# Patient Record
Sex: FEMALE | Race: WHITE | NOT HISPANIC OR LATINO | Employment: OTHER | URBAN - METROPOLITAN AREA
[De-identification: names, ages, dates, MRNs, and addresses within clinical notes are randomized per-mention and may not be internally consistent; named-entity substitution may affect disease eponyms.]

---

## 2017-01-17 ENCOUNTER — GENERIC CONVERSION - ENCOUNTER (OUTPATIENT)
Dept: OTHER | Facility: OTHER | Age: 72
End: 2017-01-17

## 2017-01-24 ENCOUNTER — GENERIC CONVERSION - ENCOUNTER (OUTPATIENT)
Dept: OTHER | Facility: OTHER | Age: 72
End: 2017-01-24

## 2017-02-01 ENCOUNTER — GENERIC CONVERSION - ENCOUNTER (OUTPATIENT)
Dept: OTHER | Facility: OTHER | Age: 72
End: 2017-02-01

## 2017-02-10 ENCOUNTER — GENERIC CONVERSION - ENCOUNTER (OUTPATIENT)
Dept: OTHER | Facility: OTHER | Age: 72
End: 2017-02-10

## 2017-02-14 ENCOUNTER — GENERIC CONVERSION - ENCOUNTER (OUTPATIENT)
Dept: OTHER | Facility: OTHER | Age: 72
End: 2017-02-14

## 2017-03-07 ENCOUNTER — GENERIC CONVERSION - ENCOUNTER (OUTPATIENT)
Dept: OTHER | Facility: OTHER | Age: 72
End: 2017-03-07

## 2017-04-03 ENCOUNTER — ALLSCRIPTS OFFICE VISIT (OUTPATIENT)
Dept: OTHER | Facility: OTHER | Age: 72
End: 2017-04-03

## 2017-04-25 ENCOUNTER — GENERIC CONVERSION - ENCOUNTER (OUTPATIENT)
Dept: OTHER | Facility: OTHER | Age: 72
End: 2017-04-25

## 2017-04-26 ENCOUNTER — GENERIC CONVERSION - ENCOUNTER (OUTPATIENT)
Dept: OTHER | Facility: OTHER | Age: 72
End: 2017-04-26

## 2017-05-04 ENCOUNTER — ALLSCRIPTS OFFICE VISIT (OUTPATIENT)
Dept: OTHER | Facility: OTHER | Age: 72
End: 2017-05-04

## 2017-05-04 ENCOUNTER — GENERIC CONVERSION - ENCOUNTER (OUTPATIENT)
Dept: OTHER | Facility: OTHER | Age: 72
End: 2017-05-04

## 2017-06-15 ENCOUNTER — GENERIC CONVERSION - ENCOUNTER (OUTPATIENT)
Dept: OTHER | Facility: OTHER | Age: 72
End: 2017-06-15

## 2017-08-15 ENCOUNTER — GENERIC CONVERSION - ENCOUNTER (OUTPATIENT)
Dept: OTHER | Facility: OTHER | Age: 72
End: 2017-08-15

## 2017-09-25 DIAGNOSIS — E11.9 TYPE 2 DIABETES MELLITUS WITHOUT COMPLICATIONS (HCC): ICD-10-CM

## 2017-09-25 DIAGNOSIS — I10 ESSENTIAL (PRIMARY) HYPERTENSION: ICD-10-CM

## 2017-09-25 DIAGNOSIS — Z00.00 ENCOUNTER FOR GENERAL ADULT MEDICAL EXAMINATION WITHOUT ABNORMAL FINDINGS: ICD-10-CM

## 2017-09-25 DIAGNOSIS — I48.0 PAROXYSMAL ATRIAL FIBRILLATION (HCC): ICD-10-CM

## 2017-09-25 DIAGNOSIS — C50.919 MALIGNANT NEOPLASM OF FEMALE BREAST (HCC): ICD-10-CM

## 2017-09-28 ENCOUNTER — APPOINTMENT (OUTPATIENT)
Dept: LAB | Facility: CLINIC | Age: 72
End: 2017-09-28
Payer: MEDICARE

## 2017-09-28 ENCOUNTER — TRANSCRIBE ORDERS (OUTPATIENT)
Dept: LAB | Facility: CLINIC | Age: 72
End: 2017-09-28

## 2017-09-28 DIAGNOSIS — I48.0 PAROXYSMAL ATRIAL FIBRILLATION (HCC): ICD-10-CM

## 2017-09-28 DIAGNOSIS — I10 ESSENTIAL (PRIMARY) HYPERTENSION: ICD-10-CM

## 2017-09-28 DIAGNOSIS — C50.919 MALIGNANT NEOPLASM OF FEMALE BREAST (HCC): ICD-10-CM

## 2017-09-28 DIAGNOSIS — E11.9 TYPE 2 DIABETES MELLITUS WITHOUT COMPLICATIONS (HCC): ICD-10-CM

## 2017-09-28 DIAGNOSIS — Z00.00 ENCOUNTER FOR GENERAL ADULT MEDICAL EXAMINATION WITHOUT ABNORMAL FINDINGS: ICD-10-CM

## 2017-09-28 LAB
ANION GAP SERPL CALCULATED.3IONS-SCNC: 12 MMOL/L (ref 4–13)
BASOPHILS # BLD AUTO: 0.05 THOUSANDS/ΜL (ref 0–0.1)
BASOPHILS NFR BLD AUTO: 1 % (ref 0–1)
BUN SERPL-MCNC: 17 MG/DL (ref 5–25)
CALCIUM SERPL-MCNC: 9.7 MG/DL (ref 8.3–10.1)
CHLORIDE SERPL-SCNC: 107 MMOL/L (ref 100–108)
CHOLEST SERPL-MCNC: 199 MG/DL (ref 50–200)
CO2 SERPL-SCNC: 22 MMOL/L (ref 21–32)
CREAT SERPL-MCNC: 0.97 MG/DL (ref 0.6–1.3)
CREAT UR-MCNC: 298 MG/DL
EOSINOPHIL # BLD AUTO: 0.36 THOUSAND/ΜL (ref 0–0.61)
EOSINOPHIL NFR BLD AUTO: 4 % (ref 0–6)
ERYTHROCYTE [DISTWIDTH] IN BLOOD BY AUTOMATED COUNT: 13.3 % (ref 11.6–15.1)
EST. AVERAGE GLUCOSE BLD GHB EST-MCNC: 154 MG/DL
GFR SERPL CREATININE-BSD FRML MDRD: 59 ML/MIN/1.73SQ M
GLUCOSE P FAST SERPL-MCNC: 139 MG/DL (ref 65–99)
HBA1C MFR BLD: 7 % (ref 4.2–6.3)
HCT VFR BLD AUTO: 42.1 % (ref 34.8–46.1)
HDLC SERPL-MCNC: 68 MG/DL (ref 40–60)
HGB BLD-MCNC: 14 G/DL (ref 11.5–15.4)
LDLC SERPL CALC-MCNC: 99 MG/DL (ref 0–100)
LYMPHOCYTES # BLD AUTO: 2.13 THOUSANDS/ΜL (ref 0.6–4.47)
LYMPHOCYTES NFR BLD AUTO: 22 % (ref 14–44)
MCH RBC QN AUTO: 31.1 PG (ref 26.8–34.3)
MCHC RBC AUTO-ENTMCNC: 33.3 G/DL (ref 31.4–37.4)
MCV RBC AUTO: 94 FL (ref 82–98)
MICROALBUMIN UR-MCNC: 289 MG/L (ref 0–20)
MICROALBUMIN/CREAT 24H UR: 97 MG/G CREATININE (ref 0–30)
MONOCYTES # BLD AUTO: 0.94 THOUSAND/ΜL (ref 0.17–1.22)
MONOCYTES NFR BLD AUTO: 10 % (ref 4–12)
NEUTROPHILS # BLD AUTO: 6.14 THOUSANDS/ΜL (ref 1.85–7.62)
NEUTS SEG NFR BLD AUTO: 63 % (ref 43–75)
NRBC BLD AUTO-RTO: 0 /100 WBCS
PLATELET # BLD AUTO: 353 THOUSANDS/UL (ref 149–390)
PMV BLD AUTO: 11.7 FL (ref 8.9–12.7)
POTASSIUM SERPL-SCNC: 4.2 MMOL/L (ref 3.5–5.3)
RBC # BLD AUTO: 4.5 MILLION/UL (ref 3.81–5.12)
SODIUM SERPL-SCNC: 141 MMOL/L (ref 136–145)
TRIGL SERPL-MCNC: 159 MG/DL
WBC # BLD AUTO: 9.65 THOUSAND/UL (ref 4.31–10.16)

## 2017-09-28 PROCEDURE — 83036 HEMOGLOBIN GLYCOSYLATED A1C: CPT

## 2017-09-28 PROCEDURE — 80048 BASIC METABOLIC PNL TOTAL CA: CPT

## 2017-09-28 PROCEDURE — 80061 LIPID PANEL: CPT

## 2017-09-28 PROCEDURE — 36415 COLL VENOUS BLD VENIPUNCTURE: CPT

## 2017-09-28 PROCEDURE — 82570 ASSAY OF URINE CREATININE: CPT

## 2017-09-28 PROCEDURE — 85025 COMPLETE CBC W/AUTO DIFF WBC: CPT

## 2017-09-28 PROCEDURE — 82043 UR ALBUMIN QUANTITATIVE: CPT

## 2017-10-04 ENCOUNTER — ALLSCRIPTS OFFICE VISIT (OUTPATIENT)
Dept: OTHER | Facility: OTHER | Age: 72
End: 2017-10-04

## 2017-10-05 ENCOUNTER — ALLSCRIPTS OFFICE VISIT (OUTPATIENT)
Dept: OTHER | Facility: OTHER | Age: 72
End: 2017-10-05

## 2017-10-06 NOTE — PROGRESS NOTES
Assessment  Assessed    1  Paroxysmal a-fib (427 31) (I48 0)   2  Anticoagulated by anticoagulation treatment (V58 61) (Z79 01)   3  Hyperlipidemia (272 4) (E78 5)   4  Obesity (278 00) (E66 9)    Discussion/Summary  Cardiology Discussion Summary Free Text Note Form [facility]:   Patient with multiple medical problems who seems to be doing reasonably well from cardiac standpoint  Previous studies reviewed with patient  Medications reviewed and possible side effects discussed  concepts of cardiovascular disease , signs and symptoms of heart disease  Dietary and risk factor modification reinforced  All questions answered  Safety measures reviewed  Patient advised to report any problems prompting medical attention  Patient report any bleeding issues  Patient does understand the risks and benefits of anticoagulation to prevent thromboembolic risk from atrial fibrillation  Rest of the medications were reviewed  Follow-up with primary care physician  Patient encouraged to continue to lose weight to reduce cardiovascular morbidity and mortality  She is thinking of bariatric surgery which is not very sure about this  Patient had a few questions which were answered  Goals and Barriers: The patient has the current Goals: Chronic anticoagulation  The patent has the current Barriers: None  Patient's Capacity to Self-Care: Patient is able to Self-Care  Patient Education: Educational resources provided: Please see discussion summary  Medication SE Review and Pt Understands Tx: Possible side effects of new medications were reviewed with the patient/guardian today  Counseling Documentation With Imm: The patient was counseled regarding risk factor reductions,-impressions,-risks and benefits of treatment options,-importance of compliance with treatment  Chief Complaint  Chief Complaint Chronic Condition [facility]: Patient is here today for follow up of chronic conditions described in HPI        History of Present Illness  Cardiology [facility] Free Text Note Form [facility]: Patient presents for follow-up visit  Patient denies any chest pain  No shortness of breath out of the ordinary  No history of leg edema orthopnea PND  No history of palpitations or dizziness  Patient denies any history of bleeding issues  Patient is on anticoagulation for atrial fibrillation  She states that she has been compliant with all her present medications  She is trying to lose weight  Review of Systems  Cardiology Female ROS:     Cardiac: as noted in HPI  Skin: No complaints of nonhealing sores or skin rash  Genitourinary: No complaints of recurrent urinary tract infections, frequent urination at night, difficult urination, blood in urine, kidney stones, loss of bladder control, kidney problems, denies any birth control or hormone replacement, is not post menopausal, not currently pregnant  Psychological: No complaints of feeling depressed, anxiety, panic attacks, or difficulty concentrating  General: No complaints of trouble sleeping, lack of energy, fatigue, appetite changes, weight changes, fever, frequent infections, or night sweats  Respiratory: No complaints of shortness of breath, cough with sputum, or wheezing  HEENT: No complaints of serious problems, hearing problems, nose problems, throat problems, or snoring  Gastrointestinal: No complaints of liver problems, nausea, vomiting, heartburn, constipation, bloody stools, diarrhea, problems swallowing, adbominal pain, or rectal bleeding  Hematologic: No complaints of bleeding disorders, anemia, blood clots, or excessive brusing  Neurological: as noted in HPI   Musculoskeletal: arthritis   ROS Reviewed:   ROS reviewed  Active Problems  Problems    1  Anticoagulated by anticoagulation treatment (V58 61) (Z79 01)   2  Arthralgia (719 40)   3  Benign essential hypertension (401 1) (I10)   4  Breast CA (174 9) (C50 919)   5   Cerebrovascular accident, 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)   6  Chronic obstructive pulmonary disease (496) (J44 9)   7  Depression (311) (F32 9)   8  Flu vaccine need (V04 81) (Z23)   9  Generalized osteoarthritis (715 00) (M15 9)   10  Hyperlipidemia (272 4) (E78 5)   11  Mitral valve disorder (424 0) (I05 9)   12  Need for revaccination (V05 9) (Z23)   13  Obesity (278 00) (E66 9)   14  Paroxysmal a-fib (427 31) (I48 0)   15  Proteinuria (791 0) (R80 9)   16  Screening for genitourinary condition (V81 6) (Z13 89)   17  Stroke syndrome (434 91) (I63 9)   18  Type 2 diabetes mellitus (250 00) (E11 9)   19  Visit for screening mammogram (V76 12) (Z12 31)    Past Medical History  Problems    1  History of Echo Transesophageal 2-D   2  History of Elevation of level of transaminase or lactic acid dehydrogenase (LDH) (790 4)   (R74 0)   3  History of acute respiratory failure (V12 69) (Z87 09)   4  History of athlete's foot (V12 09) (Z86 19)   5  History of chest pain (V13 89) (Z87 898)   6  History of hypercholesterolemia (V12 29) (Z86 39)   7  History of shortness of breath (V13 89) (Z87 898)   8  History of tinea corporis (V12 09) (Z86 19)   9  History of Inflammatory Disorders Of Breast (611 0)   10  History of Malignant Female Breast Neoplasm (V10 3)   11  History of Pulmonary Edema (514)   12  History of Septicemia (038 9) (A41 9)   13  History of Skin rash (782 1) (R21)   14  History of Splenic disorder (289 50) (D73 9)   15  History of Systemic Inflammatory Response Syndrome (SIRS) Due To Infectious    Process (995 91)  Active Problems And Past Medical History Reviewed: The active problems and past medical history were reviewed and updated today  Surgical History  Problems    1  History of Breast Reconstruction With Implant Prosthesis Bilateral   2  History of Breast Surgery Mastectomy   3  History of Splenectomy   4  History of Tonsillectomy  Surgical History Reviewed: The surgical history was reviewed and updated today         Family History  Mother 1  Family history of Diabetes Mellitus (V18 0)   2  Family history of Reported Family History Of Kidney Disease  Family History    3  Family history of Breast Cancer (V16 3)  Family History Reviewed: The family history was reviewed and updated today  Social History  Problems    · Living Independently With Spouse   · Never a smoker   · Never Drank Alcohol   · Never Used Drugs   · Retired From Work   · Denied: History of Smoking Cigarettes   ·   Social History Reviewed: The social history was reviewed and updated today  Current Meds   1  Accu-Chek Regi Plus In Vitro Strip; TEST AS DIRECTED THREE TIMES DAILY; Therapy: 24Apr2014 to (Evaluate:72Jli0328)  Requested for: 79ZPH4453; Last   Rx:10Mar2016 Ordered   2  Citalopram Hydrobromide 40 MG Oral Tablet; TAKE 1 TABLET DAILY; Therapy: 24Apr2014 to (Evaluate:62Uyo8663)  Requested for: 75NRM5983; Last   Rx:15Jfp6710 Ordered   3  Combigan 0 2-0 5 % Ophthalmic Solution; INSTILL 1 DROP Daily EACH EYE; Therapy: 08WSW4741 to (Evaluate:49Kvg9928) Recorded   4  Dilt- MG Oral Capsule Extended Release 24 Hour; take 1 capsule daily; Therapy: 42Eel3134 to (Evaluate:11Jun2018)  Requested for: 86OLJ9464; Last   Rx:16Jun2017 Ordered   5  Eliquis 5 MG Oral Tablet; Take 1 tablet twice daily; Therapy: 60QQN8397 to (Evaluate:08Jun2018)  Requested for: 13Jun2017; Last   Rx:13Jun2017 Ordered   6  Irbesartan 150 MG Oral Tablet; TAKE 2 tablets      ONE TIME DAILY; Therapy: 24Apr2014 to (Evaluate:80Jyq0635)  Requested for: 09OCZ1206; Last   Rx:04Oct2017 Ordered   7  Mag- (64 Mg) MG TBCR; TAKE 1 TABLET 3 times daily; Therapy: 24Apr2014 to Recorded   8  MetFORMIN HCl - 500 MG Oral Tablet; TAKE 1 TABLET EVERY MORNING AND TAKE 2   TABLETS IN THE EVENING; Therapy: 22JRY5998 to (Last Rx:13Apr2017)  Requested for: 13Apr2017 Ordered   9  Metoprolol Tartrate 25 MG Oral Tablet; take 1 tablet every day;    Therapy: 11DQO5511 to (Evaluate:78Nbi7837)  Requested for: 25UQX3421; Last   Rx:70Mmf8065 Ordered   10  Simvastatin 20 MG Oral Tablet; TAKE 1 TABLET DAILY; Therapy: 40PRU1816 to )  Requested for: 13Ggr8153; Last    Rx:11Dan5045 Ordered  Medication List Reviewed: The medication list was reviewed and updated today  Allergies  Medication    1  No Known Drug Allergies    Vitals  Vital Signs    Recorded: 56UUY4808 09:04AM   Heart Rate 567   Systolic 959   Diastolic 80   Height 5 ft 6 in   Weight 255 lb    BMI Calculated 41 16   BSA Calculated 2 22   O2 Saturation 96     Physical Exam    Constitutional   General appearance: No acute distress, well appearing and well nourished  Eyes   Conjunctiva and Sclera examination: Conjunctiva pink, sclera anicteric  Ears, Nose, Mouth, and Throat - Oropharynx: Clear, nares are clear, mucous membranes are moist    Neck   Neck and thyroid: Normal, supple, trachea midline, no thyromegaly  Pulmonary   Respiratory effort: No increased work of breathing or signs of respiratory distress  Auscultation of lungs: Clear to auscultation, no rales, no rhonchi, no wheezing, good air movement  Cardiovascular   Auscultation of heart: Normal rate and rhythm, normal S1 and S2, no murmurs  Carotid pulses: Normal, 2+ bilaterally  Peripheral vascular exam: Normal pulses throughout, no tenderness, erythema or swelling  Pedal pulses: Normal, 2+ bilaterally  Examination of extremities for edema and/or varicosities: Normal     Abdomen   Abdomen: Non-tender and no distention  Liver and spleen: No hepatomegaly or splenomegaly  Musculoskeletal Gait and station: Normal gait  -Digits and nails: Normal without clubbing or cyanosis -Inspection/palpation of joints, bones, and muscles: Normal, ROM normal     Skin - Skin and subcutaneous tissue: Normal without rashes or lesions  Skin is warm and well perfused, normal turgor      Neurologic - Cranial nerves: II - XII intact -Speech: Normal     Psychiatric - Orientation to person, place, and time: Normal -Mood and affect: Normal       Future Appointments    Date/Time Provider Specialty Site   12/06/2017 10:15 AM Aliza Beltran,  Internal Medicine [address] [facility]     Signatures   Electronically signed by : QUYEN Torre ; Oct  5 2017  2:42PM EST                       (Author)

## 2017-10-27 NOTE — PROGRESS NOTES
Assessment  1  Proteinuria (791 0) (R80 9)   2  Breast CA (174 9) (C50 919)   3  Benign essential hypertension (401 1) (I10)   4  Paroxysmal a-fib (427 31) (I48 0)   5  Type 2 diabetes mellitus (250 00) (E11 9)   6  Obesity (278 00) (E66 9)    Plan  Benign essential hypertension    · From  Irbesartan 150 MG Oral Tablet TAKE 1 TABLET ONE TIME DAILY To Irbesartan 150  MG Oral Tablet (Avapro) TAKE 2 tablets    ONE TIME DAILY  Proteinuria    · Jaciel Duckworth MD, [facility]  Nephrology Co-Management  *  Status: Active  Requested for: 20HIQ4934  Care Summary provided  : Yes  Unlinked    · Ciclopirox Olamine 0 77 % External Cream    Discussion/Summary  Discussion Summary:   She has diabetes which is reasonably well controlled  has increasing proteinuria and is already on irbesartan  She will increase that to 2 tablets per day and watch her blood pressure closely  If it drops down to 100-110 and she will cut back to one per day  advised her to see the nephrologist for evaluation  she's stable  She needs to lose weight and is contemplating gastric surgery though I did not encourage her to do so  any case she will increase her irbesartan  She will see the nephrologist and I will see her back here in approximately 2 months  Chief Complaint  Chief Complaint Free Text Note Form: Problems are #1 diabetes #2 hypertension #3 atrial fibrillation number for proteinuria #5 hyperlipidemia #6 depression      History of Present Illness  HPI: She comes in for follow-up  She has developed increased proteinuria  She had 289 mg/L on her recent urine  She is already on irbesartan 150 mg per day  Her creatinine is normal at 0 97   otherwise feels well  She is markedly overweight  She has a history of breast cancer and she is anticoagulated for her atrial fibrillation  Review of Systems  Complete-Female:   Constitutional: No fever, no chills, feels well, no tiredness, no recent weight gain or weight loss     Eyes: No complaints of eye pain, no red eyes, no eyesight problems, no discharge, no dry eyes, no itching of eyes  ENT: no complaints of earache, no loss of hearing, no nose bleeds, no nasal discharge, no sore throat, no hoarseness  Cardiovascular: Denies chest pain  Up-to-date on eye checkups  , but-- No complaints of slow heart rate, no fast heart rate, no chest pain, no palpitations, no leg claudication, no lower extremity edema  Respiratory: No complaints of shortness of breath, no wheezing, no cough, no SOB on exertion, no orthopnea, no PND  Gastrointestinal: No GI complaints  , but-- No complaints of abdominal pain, no constipation, no nausea or vomiting, no diarrhea, no bloody stools  Genitourinary: No complaints of dysuria, no incontinence, no pelvic pain, no dysmenorrhea, no vaginal discharge or bleeding  Musculoskeletal: arthralgias,-- joint stiffness-- and-- Had recent right knee replacement, but-- No complaints of arthralgias, no myalgias, no joint swelling or stiffness, no limb pain or swelling  Integumentary: No complaints of skin rash or lesions, no itching, no skin wounds, no breast pain or lump  Neurological: No complaints of headache, no confusion, no convulsions, no numbness, no dizziness or fainting, no tingling, no limb weakness, no difficulty walking  Psychiatric: depression-- and-- History of depression  Fairly stable on Celexa, but-- Not suicidal, no sleep disturbance, no anxiety or depression, no change in personality, no emotional problems  Endocrine: Has type 2 diabetes  A1c is 7, but-- as noted in HPI  Hematologic/Lymphatic: No complaints of swollen glands, no swollen glands in the neck, does not bleed easily, does not bruise easily  Active Problems  1  Anticoagulated by anticoagulation treatment (V58 61) (Z79 01)   2  Arthralgia (719 40)   3  Benign essential hypertension (401 1) (I10)   4  Breast CA (174 9) (C50 919)   5  Cerebrovascular accident, embolic (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 29) (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 8)   6   Chronic obstructive pulmonary disease (496) (J44 9)   7  Depression (311) (F32 9)   8  Flu vaccine need (V04 81) (Z23)   9  Generalized osteoarthritis (715 00) (M15 9)   10  Hyperlipidemia (272 4) (E78 5)   11  Mitral valve disorder (424 0) (I05 9)   12  Need for revaccination (V05 9) (Z23)   13  Obesity (278 00) (E66 9)   14  Paroxysmal a-fib (427 31) (I48 0)   15  Screening for genitourinary condition (V81 6) (Z13 89)   16  Stroke syndrome (434 91) (I63 9)   17  Type 2 diabetes mellitus (250 00) (E11 9)   18  Visit for screening mammogram (V76 12) (Z12 31)    Past Medical History  1  History of Echo Transesophageal 2-D   2  History of Elevation of level of transaminase or lactic acid dehydrogenase (LDH) (790 4) (R74 0)   3  History of acute respiratory failure (V12 69) (Z87 09)   4  History of athlete's foot (V12 09) (Z86 19)   5  History of chest pain (V13 89) (Z87 898)   6  History of hypercholesterolemia (V12 29) (Z86 39)   7  History of shortness of breath (V13 89) (Z87 898)   8  History of tinea corporis (V12 09) (Z86 19)   9  History of Inflammatory Disorders Of Breast (611 0)   10  History of Malignant Female Breast Neoplasm (V10 3)   11  History of Pulmonary Edema (514)   12  History of Septicemia (038 9) (A41 9)   13  History of Skin rash (782 1) (R21)   14  History of Splenic disorder (289 50) (D73 9)   15  History of Systemic Inflammatory Response Syndrome (SIRS) Due To Infectious Process (995 91)  Active Problems And Past Medical History Reviewed: The active problems and past medical history were reviewed and updated today  Surgical History  1  History of Breast Reconstruction With Implant Prosthesis Bilateral   2  History of Breast Surgery Mastectomy   3  History of Splenectomy   4  History of Tonsillectomy  Surgical History Reviewed: The surgical history was reviewed and updated today  Family History  Mother    1  Family history of Diabetes Mellitus (V18 0)   2   Family history of Reported Family History Of Kidney Disease  Family History    3  Family history of Breast Cancer (V16 3)  Family History Reviewed: The family history was reviewed and updated today  Social History   · Living Independently With Spouse   · Never a smoker   · Never Drank Alcohol   · Never Used Drugs   · Retired From Work   · Denied: History of Smoking Cigarettes   ·   Social History Reviewed: The social history was reviewed and updated today  The social history was reviewed and is unchanged  Current Meds   1  Accu-Chek Regi Plus In Vitro Strip; TEST AS DIRECTED THREE TIMES DAILY; Therapy: 24Apr2014 to (Evaluate:84Sik2215)  Requested for: 54PEP9291; Last Rx:10Mar2016   Ordered   2  Ciclopirox Olamine 0 77 % External Cream; APPLY INCH  PRN; Therapy: 25CTM3835 to (Evaluate:12Nov2015) Recorded   3  Citalopram Hydrobromide 40 MG Oral Tablet; TAKE 1 TABLET DAILY; Therapy: 24Apr2014 to (Evaluate:72Snd4448)  Requested for: 68PNY9575; Last Rx:11Pyw6843   Ordered   4  Combigan 0 2-0 5 % Ophthalmic Solution; INSTILL 1 DROP Daily EACH EYE; Therapy: 62XGS3081 to (Evaluate:09Feb2015) Recorded   5  Dilt- MG Oral Capsule Extended Release 24 Hour; take 1 capsule daily; Therapy: 51Nlk7725 to (Evaluate:11Jun2018)  Requested for: 41URA7099; Last Rx:16Jun2017   Ordered   6  Eliquis 5 MG Oral Tablet; Take 1 tablet twice daily; Therapy: 84HWP3469 to (Evaluate:08Jun2018)  Requested for: 86NTP9677; Last Rx:13Jun2017   Ordered   7  Irbesartan 150 MG Oral Tablet; TAKE 1 TABLET ONE TIME DAILY; Therapy: 24Apr2014 to (Evaluate:51Gdr2069)  Requested for: 27GMQ3271; Last Rx:22Vmh4343   Ordered   8  Mag- (64 Mg) MG TBCR; TAKE 1 TABLET 3 times daily; Therapy: 24Apr2014 to Recorded   9  MetFORMIN HCl - 500 MG Oral Tablet; TAKE 1 TABLET EVERY MORNING AND TAKE 2 TABLETS IN   THE EVENING; Therapy: 50DFM4617 to (Last Rx:13Apr2017)  Requested for: 13Apr2017 Ordered   10   Metoprolol Tartrate 25 MG Oral Tablet; take 1 tablet every day;    Therapy: 59GTN9903 to (Evaluate:04Tzz7930)  Requested for: 82IGY4028; Last Rx:49Qrr3200    Ordered   11  Simvastatin 20 MG Oral Tablet; TAKE 1 TABLET DAILY; Therapy: 04AHM3777 to [id number])  Requested for: 20Apr2017; Last Rx:20Apr2017    Ordered  Medication List Reviewed: The medication list was reviewed and updated today  Allergies  1  No Known Drug Allergies    Vitals  Vital Signs    Recorded: 60BVK5741 01:59PM   Heart Rate 70    Systolic 251    Diastolic 80    Height 5 ft 6 in    Patient Refused Weight Yes Yes   O2 Saturation 98      Physical Exam    Constitutional   General appearance: Abnormal  -- She is overweight  She refuses to get weighed  Her blood pressure is 122/74  Eyes   Conjunctiva and lids: No swelling, erythema or discharge  Pupils and irises: Equal, round and reactive to light  Ears, Nose, Mouth, and Throat   External inspection of ears and nose: Normal     Nasal mucosa, septum, and turbinates: Normal without edema or erythema  Oropharynx: Normal with no erythema, edema, exudate or lesions  Pulmonary   Respiratory effort: No increased work of breathing or signs of respiratory distress  Auscultation of lungs: Clear to auscultation  -- Lungs are clear  Cardiovascular   Auscultation of heart: Abnormal  -- Rhythm is irregularly irregular  Rates in the 76s  Examination of extremities for edema and/or varicosities: Abnormal  -- Intact peripheral pulses  No peripheral edema  Carotid pulses: Normal     Abdomen   Abdomen: Abnormal  -- Abdomen soft obese  Lymphatic   Palpation of lymph nodes in neck: No lymphadenopathy  Musculoskeletal   Inspection/palpation of joints, bones, and muscles: Abnormal  -- Well healed scar of the right knee  Skin   Skin and subcutaneous tissue: Normal without rashes or lesions  Neurologic   Cranial nerves: Cranial nerves 2-12 intact  Sensation: No sensory loss      Psychiatric   Orientation to person, place, and time: Normal     Mood and affect: Normal          Results/Data  PHQ-9 Adult Depression Screening 54YCW9613 01:55PM User, [employer]     Test Name Result Flag Reference   PHQ-9 Adult Depression Score 0     Over the last two weeks, how often have you been bothered by any of the following problems? Little interest or pleasure in doing things: Not at all - 0  Feeling down, depressed, or hopeless: Not at all - 0  Trouble falling or staying asleep, or sleeping too much: Not at all - 0  Feeling tired or having little energy: Not at all - 0  Poor appetite or over eating: Not at all - 0  Feeling bad about yourself - or that you are a failure or have let yourself or your family down: Not at all - 0  Trouble concentrating on things, such as reading the newspaper or watching television: Not at all - 0  Moving or speaking so slowly that other people could have noticed  Or the opposite -  being so fidgety or restless that you have been moving around a lot more than usual: Not at all - 0  Thoughts that you would be better off dead, or of hurting yourself in some way: Not at all - 0   PHQ-9 Adult Depression Screening Negative     PHQ-9 Difficulty Level Not difficult at all     PHQ-9 Severity No Depression       Falls Risk Assessment (Dx Z13 89 Screen for Neurologic Disorder) 44RLK7746 01:55PM User, [employer]     Test Name Result Flag Reference   Falls Risk      No falls in the past year       Future Appointments    Date/Time Provider Specialty Site   10/05/2017 09:00 AM QUYEN Vasques   Cardiology [facility]     Signatures   Electronically signed by : Khalif Fischer DO; Oct  4 2017  2:31PM EST                       (Author)

## 2017-11-21 ENCOUNTER — GENERIC CONVERSION - ENCOUNTER (OUTPATIENT)
Dept: OTHER | Facility: OTHER | Age: 72
End: 2017-11-21

## 2017-12-14 ENCOUNTER — ALLSCRIPTS OFFICE VISIT (OUTPATIENT)
Dept: OTHER | Facility: OTHER | Age: 72
End: 2017-12-14

## 2017-12-14 DIAGNOSIS — R80.9 PROTEINURIA: ICD-10-CM

## 2017-12-15 NOTE — CONSULTS
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)   4  Benign essential hypertension (401 1) (I10)   5  Breast CA (174 9) (C50 919)   6  Paroxysmal A-fib (427 31) (I48 0)    Plan  Proteinuria    · (1) BASIC METABOLIC PROFILE; Status:Active; Requested for:14Dec2017;    Perform:[facility]; Due:13Ixb7299; Ordered; Lore Miller; Ordered By:Terrance Sousa;   · (1) CBC/ PLT (NO DIFF); Status:Active; Requested for:40Lix0440;    Perform:[facility]; Due:19Kzd7709; Ordered; Lore Miller; Ordered By:Terrance Sousa;   · (1) PROTEIN ELECTRO, SERUM; Status:Active; Requested for:65Ddc7251;    Perform:[facility]; Due:17Rud6539; Ordered; Lore Miller; Ordered By:Terrance Sousa;   · (1) PROTEIN ELECTRO, URINE; Status:Active; Requested for:14Dec2017;    Perform:[facility]; Due:85Yoz0712; Ordered; Lore Miller; Ordered By:Terrance Sousa;   · (1) URINALYSIS (will reflex a microscopy if leukocytes, occult blood, protein or nitrites arenot within normal limits); Status:Active; Requested for:14Dec2017;    Perform:[facility]; Due:10Nrw1532; Ordered; Lore Miller; Ordered By:Terrance Sousa;   · (1) URINE PROTEIN CREATININE RATIO; Status:Active; Requested for:14Dec2017;    Perform:[facility]; Due:78San6145; Ordered; Lore Miller; Ordered By:Terrance Sousa;   · US KIDNEY AND BLADDER; Status:Hold For - Scheduling; Requested for:14Dec2017;    Perform:[facility]; Due:14Dec2018; Ordered; Lore Miller; Ordered By:Terrance Sousa;   · Follow-up visit in 1 month Evaluation and Treatment  Follow-up  Status: Hold For -Scheduling  Requested for: 95DZW0415   Ordered; For: Proteinuria; Ordered By: Radha Fink Performed:  Due: 72EFY3364    Discussion/Summary    Proteinuria: non nephrotic range  Likely etiology is diabetes  Weight and high blood pressure may be contributing   I discussed that with her at length  Advised to lose weight which she is trying advised to avoid any nonsteroidal pain killer which she is doing  Blood pressure and diabetic control also discussed with her  I will do some basic workup for proteinuria to rule out any other etiology  She is on ARB blocker and dose was recently increased which was right thing to do  Diabetes type 2: Hemoglobin A1c is 7 1 which can be better and I discussed that withHypertension: Well controlObesity: Which reduction discussed withParoxysmal atrial fibrillation: On Eliquis and diltiazem and being monitored by cardiologistThank you very much for the consultation I will follow the patient with you  The patient was counseled regarding diagnostic results,-- instructions for management,-- risk factor reductions,-- prognosis  Goals: Goals have been met  Patient is able to Self-Care  Possible side effects of new medications were reviewed with the patient/guardian today  CKD Teaching includes Avoid nephrotoxic medication, dietician counseling and sodium restriction  Reason For Visit  Alysia Bennett came in today for evaluation of proteinuria      History of Present Illness  [de-identified] woman with long history of diabetes also has a weight problem was recently found to have proteinuria which is getting worse so she was referred to [facility] [place] is quite asymptomatic  Has some joint pain but does not take any pain killer  She does have history of CVA in the past and atrial fibrillation and on blood thinner at this point  She denies any history of diabetic retinopathy      Review of Systems   Constitutional: No fever, no chills, feels well, no tiredness, no recent weight gain or weight loss  Eyes: No complaints of eye pain, no red eyes, no eyesight problems, no discharge, no dry eyes, no itching of eyes  ENT: no complaints of earache, no loss of hearing, no nose bleeds, no nasal discharge, no sore throat, no hoarseness    Cardiovascular: No complaints of slow heart rate, no fast heart rate, no chest pain, no palpitations, no leg claudication, no lower extremity edema  Respiratory: No complaints of shortness of breath, no wheezing, no cough, no SOB on exertion, no orthopnea, no PND  Gastrointestinal: No complaints of abdominal pain, no constipation, no nausea or vomiting, no diarrhea, no bloody stools  Genitourinary: No complaints of dysuria, no incontinence, no pelvic pain, no dysmenorrhea, no vaginal discharge or bleeding  Musculoskeletal: arthralgias  Integumentary: No complaints of skin rash or lesions, no itching, no skin wounds, no breast pain or lump  Neurological: No complaints of headache, no confusion, no convulsions, no numbness, no dizziness or fainting, no tingling, no limb weakness, no difficulty walking  Psychiatric: Not suicidal, no sleep disturbance, no anxiety or depression, no change in personality, no emotional problems  Endocrine: No complaints of proptosis, no hot flashes, no muscle weakness, no deepening of the voice, no feelings of weakness  Hematologic/Lymphatic: No complaints of swollen glands, no swollen glands in the neck, does not bleed easily, does not bruise easily  ROS reviewed  Active Problems  1  Anticoagulated by anticoagulation treatment (V58 61) (Z79 01)   2  Arthralgia (719 40)   3  Benign essential hypertension (401 1) (I10)   4  Breast CA (174 9) (C50 919)   5  Cerebrovascular accident, embolic (601 54) (O21 1)   6  Chronic obstructive pulmonary disease (496) (J44 9)   7  Depression (311) (F32 9)   8  Flu vaccine need (V04 81) (Z23)   9  Generalized osteoarthritis (715 00) (M15 9)   10  Hyperlipidemia (272 4) (E78 5)   11  Mitral valve disorder (424 0) (I05 9)   12  Need for revaccination (V05 9) (Z23)   13  Obesity (278 00) (E66 9)   14  Paroxysmal A-fib (427 31) (I48 0)   15  Proteinuria (791 0) (R80 9)   16  Screening for genitourinary condition (V81 6) (Z13 89)   17  Stroke syndrome (434 91) (I63 9)   18   Type 2 diabetes mellitus (250 00) (E11 9)   19  Visit for screening mammogram (V76 12) (Z12 31)    Past Medical History  1  History of Echo Transesophageal 2-D   2  History of Elevation of level of transaminase or lactic acid dehydrogenase (LDH) (790 4) (R74 0)   3  History of acute respiratory failure (V12 69) (Z87 09)   4  History of athlete's foot (V12 09) (Z86 19)   5  History of chest pain (V13 89) (Z87 898)   6  History of hypercholesterolemia (V12 29) (Z86 39)   7  History of shortness of breath (V13 89) (Z87 898)   8  History of tinea corporis (V12 09) (Z86 19)   9  History of Inflammatory Disorders Of Breast (611 0)   10  History of Malignant Female Breast Neoplasm (V10 3)   11  History of Pulmonary Edema (514)   12  History of Septicemia (038 9) (A41 9)   13  History of Skin rash (782 1) (R21)   14  History of Splenic disorder (289 50) (D73 9)   15  History of Systemic Inflammatory Response Syndrome (SIRS) Due To Infectious  Process (995 91)    The active problems and past medical history were reviewed and updated today  Surgical History  1  History of Breast Reconstruction With Implant Prosthesis Bilateral   2  History of Breast Surgery Mastectomy   3  History of Splenectomy   4  History of Tonsillectomy    The surgical history was reviewed and updated today  Family History  Mother    1  Family history of Diabetes Mellitus (V18 0)   2  Family history of Reported Family History Of Kidney Disease  Family History    3  Family history of Breast Cancer (V16 3)    The family history was reviewed and updated today  Social History   · Living Independently With Spouse   · Never a smoker   · Never Drank Alcohol   · Never Used Drugs   · Retired From Work   · Denied: History of Smoking Cigarettes   ·   The social history was reviewed and updated today  Current Meds   1  Accu-Chek Regi Plus In Vitro Strip; TEST AS DIRECTED THREE TIMES DAILY;  Therapy: 24Apr2014 to (Marina Montiel)  Requested for: 49DHV7747 Recorded   2  Combigan 0 2-0 5 % Ophthalmic Solution; INSTILL 1 DROP Daily EACH EYE; Therapy: 17VGS5304 to (Evaluate:09Feb2015) Recorded   3  Dilt- MG Oral Capsule Extended Release 24 Hour; take 1 capsule daily; Therapy: 33Glp8085 to (Evaluate:11Jun2018)  Requested for: 93EOP3958; Last Rx:40Plm2680 Ordered   4  Eliquis 5 MG Oral Tablet; Take 1 tablet twice daily; Therapy: 05HFR3600 to (Evaluate:08Jun2018)  Requested for: 47OWB1242; Last Rx:56Lul6910 Ordered   5  Irbesartan 150 MG Oral Tablet; TAKE 2 tablets  ONE TIME DAILY; Therapy: 81Kns6492 to (Evaluate:84Wly1845)  Requested for: 95ZUA1290; Last Rx:37Rab9846 Ordered   6  Mag- (64 Mg) MG TBCR; TAKE 1 TABLET 3 times daily; Therapy: 24Apr2014 to Recorded   7  MetFORMIN HCl - 500 MG Oral Tablet; TAKE 1 TABLET EVERY MORNING AND TAKE 2 TABLETS IN THE EVENING; Therapy: 83NVS7295 to (Last Rx:13Apr2017)  Requested for: 77Hzp7767 Ordered   8  Metoprolol Tartrate 25 MG Oral Tablet; take 1 tablet every day; Therapy: 34DAT1295 to (Evaluate:77Xxp7877)  Requested for: 01SNO6416; Last Rx:84Asc9878 Ordered   9  Simvastatin 20 MG Oral Tablet; TAKE 1 TABLET DAILY; Therapy: 11ZSM6370 to (Evaluate:15Apr2018)  Requested for: 64Rjc5871; Last Rx:12Ccm7547 Ordered    The medication list was reviewed and updated today  Allergies  1  No Known Drug Allergies    Vitals  Vital Signs    Recorded: 94MDL2418 09:12AM Recorded: 08GLT5584 08:42AM   Temperature  97 9 F   Heart Rate 80, Apical    Pulse Quality Normal, Apical    Respiration Quality Normal    Respiration 16    Systolic 960, LUE, Sitting    Diastolic 70, LUE, Sitting    Height  5 ft 6 in   Weight  254 lb    BMI Calculated  41   BSA Calculated  2 21     Physical Exam   Constitutional: General appearance: Abnormal   obese  ENT: External ears and nose appear normal     Eyes: Anicteric sclerae  Neck: No bruit heard over either carotid  JVD:  No JVD present    Pulmonary: Respiratory effort: No increased work of breathing or signs of respiratory distress  -- Auscultation of lungs: Clear to auscultation  Cardiovascular: Auscultation of heart: Normal rate and rhythm, normal S1 and S2, without murmurs  Abdomen: Non-tender, no masses  Extremities: No cyanosis, clubbing or edema  Pulses: Dorsalis Pedis and Posterior Tibial pulses normal   Rash: No rash present  Neurologic: Non Focal     Psychiatric: Orientation to person, place, and time: Normal  -- and-- Mood and affect: Normal        Future Appointments    Date/Time Provider Specialty Site   01/16/2018 01:30 PM QUYEN Amato   Nephrology [address]     Signatures   Electronically signed by : QUYEN Rowland ; Dec 14 2017  9:27AM EST                       (Author)

## 2017-12-27 ENCOUNTER — HOSPITAL ENCOUNTER (OUTPATIENT)
Dept: ULTRASOUND IMAGING | Facility: HOSPITAL | Age: 72
Discharge: HOME | End: 2017-12-30
Attending: INTERNAL MEDICINE
Payer: MEDICARE

## 2017-12-27 DIAGNOSIS — R80.9 PROTEINURIA: ICD-10-CM

## 2017-12-27 PROCEDURE — 76770 US EXAM ABDO BACK WALL COMP: CPT

## 2018-01-09 ENCOUNTER — APPOINTMENT (OUTPATIENT)
Dept: LAB | Facility: HOSPITAL | Age: 73
End: 2018-01-09
Attending: INTERNAL MEDICINE
Payer: MEDICARE

## 2018-01-09 DIAGNOSIS — R80.9 PROTEINURIA: ICD-10-CM

## 2018-01-09 LAB
ANION GAP SERPL CALCULATED.3IONS-SCNC: 10 MMOL/L (ref 4–13)
BILIRUB UR QL STRIP: NEGATIVE
BUN SERPL-MCNC: 18 MG/DL (ref 5–25)
CALCIUM SERPL-MCNC: 10 MG/DL (ref 8.3–10.1)
CHLORIDE SERPL-SCNC: 104 MMOL/L (ref 100–108)
CLARITY UR: CLEAR
CO2 SERPL-SCNC: 27 MMOL/L (ref 21–32)
COLOR UR: YELLOW
CREAT SERPL-MCNC: 0.87 MG/DL (ref 0.6–1.3)
CREAT UR-MCNC: 43.9 MG/DL
ERYTHROCYTE [DISTWIDTH] IN BLOOD BY AUTOMATED COUNT: 12.9 % (ref 11.6–15.1)
GFR SERPL CREATININE-BSD FRML MDRD: 68 ML/MIN/1.73SQ M
GLUCOSE P FAST SERPL-MCNC: 134 MG/DL (ref 65–99)
GLUCOSE UR STRIP-MCNC: NEGATIVE MG/DL
HCT VFR BLD AUTO: 41.1 % (ref 34.8–46.1)
HGB BLD-MCNC: 13.3 G/DL (ref 11.5–15.4)
HGB UR QL STRIP.AUTO: NEGATIVE
KETONES UR STRIP-MCNC: NEGATIVE MG/DL
LEUKOCYTE ESTERASE UR QL STRIP: NEGATIVE
MCH RBC QN AUTO: 30.4 PG (ref 26.8–34.3)
MCHC RBC AUTO-ENTMCNC: 32.4 G/DL (ref 31.4–37.4)
MCV RBC AUTO: 94 FL (ref 82–98)
NITRITE UR QL STRIP: NEGATIVE
PH UR STRIP.AUTO: 6 [PH] (ref 4.5–8)
PLATELET # BLD AUTO: 331 THOUSANDS/UL (ref 149–390)
PMV BLD AUTO: 11.4 FL (ref 8.9–12.7)
POTASSIUM SERPL-SCNC: 4.2 MMOL/L (ref 3.5–5.3)
PROT UR STRIP-MCNC: NEGATIVE MG/DL
PROT UR-MCNC: 9 MG/DL
PROT/CREAT UR: 0.21 MG/G{CREAT} (ref 0–0.1)
RBC # BLD AUTO: 4.37 MILLION/UL (ref 3.81–5.12)
SODIUM SERPL-SCNC: 141 MMOL/L (ref 136–145)
SP GR UR STRIP.AUTO: <=1.005 (ref 1–1.03)
UROBILINOGEN UR QL STRIP.AUTO: 0.2 E.U./DL
WBC # BLD AUTO: 9.32 THOUSAND/UL (ref 4.31–10.16)

## 2018-01-09 PROCEDURE — 84166 PROTEIN E-PHORESIS/URINE/CSF: CPT

## 2018-01-09 PROCEDURE — 84165 PROTEIN E-PHORESIS SERUM: CPT

## 2018-01-09 PROCEDURE — 82570 ASSAY OF URINE CREATININE: CPT

## 2018-01-09 PROCEDURE — 36415 COLL VENOUS BLD VENIPUNCTURE: CPT

## 2018-01-09 PROCEDURE — 85027 COMPLETE CBC AUTOMATED: CPT

## 2018-01-09 PROCEDURE — 80048 BASIC METABOLIC PNL TOTAL CA: CPT

## 2018-01-09 PROCEDURE — 81003 URINALYSIS AUTO W/O SCOPE: CPT

## 2018-01-09 PROCEDURE — 84156 ASSAY OF PROTEIN URINE: CPT

## 2018-01-10 ENCOUNTER — GENERIC CONVERSION - ENCOUNTER (OUTPATIENT)
Dept: OTHER | Facility: OTHER | Age: 73
End: 2018-01-10

## 2018-01-11 LAB
ALBUMIN SERPL ELPH-MCNC: 4.11 G/DL (ref 3.5–5)
ALBUMIN SERPL ELPH-MCNC: 51.4 % (ref 52–65)
ALPHA1 GLOB SERPL ELPH-MCNC: 0.4 G/DL (ref 0.1–0.4)
ALPHA1 GLOB SERPL ELPH-MCNC: 5 % (ref 2.5–5)
ALPHA2 GLOB SERPL ELPH-MCNC: 0.98 G/DL (ref 0.4–1.2)
ALPHA2 GLOB SERPL ELPH-MCNC: 12.2 % (ref 7–13)
BETA GLOB ABNORMAL SERPL ELPH-MCNC: 0.57 G/DL (ref 0.4–0.8)
BETA1 GLOB SERPL ELPH-MCNC: 7.1 % (ref 5–13)
BETA2 GLOB SERPL ELPH-MCNC: 9.3 % (ref 2–8)
BETA2+GAMMA GLOB SERPL ELPH-MCNC: 0.74 G/DL (ref 0.2–0.5)
GAMMA GLOB ABNORMAL SERPL ELPH-MCNC: 1.2 G/DL (ref 0.5–1.6)
GAMMA GLOB SERPL ELPH-MCNC: 15 % (ref 12–22)
IGG/ALB SER: 1.06 {RATIO} (ref 1.1–1.8)
PROT PATTERN SERPL ELPH-IMP: ABNORMAL
PROT SERPL-MCNC: 8 G/DL (ref 6.4–8.2)

## 2018-01-12 VITALS
OXYGEN SATURATION: 96 % | HEART RATE: 70 BPM | SYSTOLIC BLOOD PRESSURE: 144 MMHG | DIASTOLIC BLOOD PRESSURE: 80 MMHG | HEIGHT: 66 IN

## 2018-01-12 VITALS
WEIGHT: 262 LBS | OXYGEN SATURATION: 96 % | DIASTOLIC BLOOD PRESSURE: 66 MMHG | SYSTOLIC BLOOD PRESSURE: 126 MMHG | HEIGHT: 66 IN | BODY MASS INDEX: 42.11 KG/M2 | HEART RATE: 68 BPM

## 2018-01-12 LAB
ALBUMIN UR ELPH-MCNC: 100 %
ALPHA1 GLOB MFR UR ELPH: 0 %
ALPHA2 GLOB MFR UR ELPH: 0 %
B-GLOBULIN MFR UR ELPH: 0 %
GAMMA GLOB MFR UR ELPH: 0 %
PROT PATTERN UR ELPH-IMP: NORMAL
PROT UR-MCNC: 12 MG/DL

## 2018-01-13 VITALS
HEART RATE: 70 BPM | DIASTOLIC BLOOD PRESSURE: 80 MMHG | OXYGEN SATURATION: 98 % | HEIGHT: 66 IN | SYSTOLIC BLOOD PRESSURE: 132 MMHG

## 2018-01-14 VITALS
SYSTOLIC BLOOD PRESSURE: 114 MMHG | WEIGHT: 255 LBS | HEIGHT: 66 IN | BODY MASS INDEX: 40.98 KG/M2 | DIASTOLIC BLOOD PRESSURE: 80 MMHG | HEART RATE: 104 BPM | OXYGEN SATURATION: 96 %

## 2018-01-16 ENCOUNTER — ALLSCRIPTS OFFICE VISIT (OUTPATIENT)
Dept: OTHER | Facility: OTHER | Age: 73
End: 2018-01-16

## 2018-01-16 DIAGNOSIS — R80.9 PROTEINURIA: ICD-10-CM

## 2018-01-17 NOTE — PROGRESS NOTES
Assessment   1  Proteinuria (791 0) (R80 9)   2  Type 2 diabetes mellitus (250 00) (E11 9)    Plan   Proteinuria    · (1) BASIC METABOLIC PROFILE; Status:Active; Requested END:06LRX5292; Perform:[facility]; RWW:51DGK5202;SGYQEOQ; Odella Hernandez; Ordered By:Terrance Sousa;   · (1) CBC/ PLT (NO DIFF); Status:Active; Requested NTW:48INZ9160; Perform:[facility]; YLX:60APY8495;PJXGRDU; Odella Hernandez; Ordered By:Terrance Sousa;   · (1) URINALYSIS (will reflex a microscopy if leukocytes, occult blood, protein or nitrites are    not within normal limits); Status:Active; Requested GPY:62WLY7683; Perform:[facility]; XDW:17ASN9936;BLWONFR; Odella Hernandez; Ordered By:Terrance Sousa;   · (1) URINE PROTEIN CREATININE RATIO; Status:Active; Requested RLA:15VQX8779; Perform:[facility]; ZSU:18WCZ4252;QUSDLWT; Trishella Hernandez; Ordered By:Terrance Sousa;   · (Q) PROTEIN, TOTAL W/CREAT, 24 HOUR URINE; Status:Active; Requested    LNK:54TET3963; Perform:[facility]; SERG:55QSN5520;UYKQYOV; Odella Hernandez; Ordered By:Terrance Sousa;   · Follow-up visit in 1 year Evaluation and Treatment  Follow-up  Status: Complete -    Scheduling  Done: 53AHK9559 02:27PM   Ordered; For: Proteinuria; Ordered By: Rossi Gongora Performed:  Due: 13JSF8564; Last Updated By: Aure Bhat; 1/16/2018 2:28:07 PM    Discussion/Summary      Proteinuria: She has proteinuria about 200 mg  Possibly because of diabetes  She is on ARB blocker which I will continue  Renal function is stable at this point   type 2: Being monitored by primary doctor  Discuss with her about well control   Very well control   will see her back in 6 months again  The patient was counseled regarding diagnostic results,-- instructions for management,-- risk factor reductions,-- prognosis  The patient has the current Goals: Keep kidney function stable  The patent has the current Barriers: None  Patient is able to Self-Care      Possible side effects of new medications were reviewed with the patient/guardian today  CKD Teaching includes hypertension management and Avoid nephrotoxic medication  Reason For Visit   Hermila Lares came in today for follow-up of proteinuria      History of Present Illness   She is suffering from cold  Has some cough but no other acute complaint  Not taking any medication at this point for that      Review of Systems        Constitutional: fatigue  Integumentary: No complaints of skin rash  Gastrointestinal: No complains of abdominal pain, no constipation or diarrhea, no nausea or vomiting  Respiratory: shortness of breath-- and-- cough, but-- No complaints of shortness of breath, no cough, no productive sputum  Cardiovascular: No complaints of orthopnea, no PND, no chest pain, no palpitations, no lower extremity edema  Musculoskeletal: joint pain  Neurological: No complaints of headache, no lightheadedness or dizziness  Genitourinary: No dysuria, no hematuria, no nocturia, no urinary frequency, no incomplete emptying of bladder, no foamy urine  Eyes: No complaints of eyesight problems or dryness of eyes  ROS reviewed  Past Medical History      The active problems and past medical history were reviewed and updated today  Current Meds    1  Accu-Chek Regi Plus In Vitro Strip; TEST AS DIRECTED THREE TIMES DAILY; Therapy: 80Duv5726 to (Bruna Clubs)  Requested for: 58WEL0278 Recorded   2  Combigan 0 2-0 5 % Ophthalmic Solution; INSTILL 1 DROP Daily EACH EYE; Therapy: 81XXU3145 to (Evaluate:78Dgu4364) Recorded   3  Dilt- MG Oral Capsule Extended Release 24 Hour; take 1 capsule daily; Therapy: 46Hnv3756 to (Evaluate:11Jun2018)  Requested for: 11HMX2271; Last     Rx:16Jun2017 Ordered   4  Eliquis 5 MG Oral Tablet; Take 1 tablet twice daily; Therapy: 23EKX6565 to (Evaluate:08Jun2018)  Requested for: 70AAX6274; Last     Rx:13Jun2017 Ordered   5  Irbesartan 150 MG Oral Tablet; TAKE 2 tablets          ONE TIME DAILY; Therapy: 06Fvi1237 to (Evaluate:52Evf1855)  Requested for: 32WTF9833; Last     Rx:97Qcd1043 Ordered   6  Mag- (64 Mg) MG TBCR; TAKE 1 TABLET 3 times daily; Therapy: 71Kzd5277 to Recorded   7  MetFORMIN HCl - 500 MG Oral Tablet; TAKE 1 TABLET EVERY MORNING AND TAKE 2     TABLETS IN THE EVENING; Therapy: 07OMK2641 to (Last Rx:82Vrs4189)  Requested for: 52Xha2763 Ordered   8  Metoprolol Tartrate 25 MG Oral Tablet; take 1 tablet every day; Therapy: 95CKT1158 to (Evaluate:83Ocp9239)  Requested for: 27IPL5368; Last     Rx:26Llx4925 Ordered   9  Nystatin 467257 UNIT/GM External Powder; Therapy: 07MMT4452 to Recorded   10  Simvastatin 20 MG Oral Tablet; TAKE 1 TABLET DAILY; Therapy: 48PLS5045 to (Evaluate:48Hcq9613)  Requested for: 84Czh3671; Last      Rx:26Yjh4939 Ordered     The medication list was reviewed and updated today  Allergies   1  No Known Drug Allergies    Vitals   Vital Signs    Recorded: 37GNB7841 01:29PM Recorded: 11BVI1657 01:14PM   Temperature  99 F   Heart Rate 80, Apical    Pulse Quality Normal, Apical    Respiration Quality Normal    Respiration 16    Systolic 881, RUE, Sitting    Diastolic 80, RUE, Sitting    Weight  260 lb    BMI Calculated  41 97   BSA Calculated  2 24     Physical Exam        Constitutional: General appearance: No acute distress, well appearing and well nourished  Eyes: Anicteric sclerae  Neck: No bruit heard over either carotid  JVD:  No JVD present  Pulmonary: Respiratory effort: No increased work of breathing or signs of respiratory distress  -- Auscultation of lungs: Clear to auscultation  Cardiovascular: Auscultation of heart: Normal rate and rhythm, normal S1 and S2, without murmurs  Extremities: No cyanosis, clubbing or edema           Results/Data   Nephrology Flowsheet 31VKU8781 11:33AM Zayda Speaker      Test Name Result Flag Reference   WBC 9 32     Hemoglobin 13 3     Hematocrit 41 1     Platelets 370     Sodium 141     Potassium 4 2     Chloride 104     Carbon Dioxide 27     Calcium 10 0     GLUCOSE 134 H    BUN 18     Serum Creatinine 0 87     GFR, NON-AFRICAN AMERICAN 68     Urine Protein Creatinine Ratio 0 21 H    Urinalysis Date 1/9/2018     SPECIFIC GRAVITY UA <=1 005     BLOOD UA neg     PH UA 6 0     PROTEIN UA neg     NITRITE UA neg     LEUKOCYTE ESTERASE UA neg        (1) BASIC METABOLIC PROFILE 76JNO2432 09:09AM [facility]      Test Name Result Flag Reference   SODIUM 141 mmol/L  136-145   POTASSIUM 4 2 mmol/L  3 5-5 3   CHLORIDE 104 mmol/L  100-108   CARBON DIOXIDE 27 mmol/L  21-32   ANION GAP (CALC) 10 mmol/L  4-13   BLOOD UREA NITROGEN 18 mg/dL  5-25   CREATININE 0 87 mg/dL  0 60-1 30   Standardized to IDMS reference method   CALCIUM 10 0 mg/dL  8 3-10 1   eGFR 68 ml/min/1 73sq m     National Kidney Disease Education Program recommendations are as follows:     GFR calculation is accurate only with a steady state creatinine     Chronic Kidney disease less than 60 ml/min/1 73 sq  meters     Kidney failure less than 15 ml/min/1 73 sq  meters  GLUCOSE FASTING 134 mg/dL H 65-99   Specimen collection should occur prior to Sulfasalazine administration due to the potential for falsely depressed results  Specimen collection should occur prior to Sulfapyridine administration due to the potential for falsely elevated results        (1) CBC/ PLT (NO DIFF) 29GQT1513 09:09AM [facility]      Test Name Result Flag Reference   HEMATOCRIT 41 1 %  34 8-46 1   HEMOGLOBIN 13 3 g/dL  11 5-15 4   MCHC 32 4 g/dL  31 4-37 4   MCH 30 4 pg  26 8-34 3   MCV 94 fL  82-98   PLATELET COUNT 327 Thousands/uL  149-390   RBC COUNT 4 37 Million/uL  3 81-5 12   RDW 12 9 %  11 6-15 1   WBC COUNT 9 32 Thousand/uL  4 31-10 16   MPV 11 4 fL  8 9-12 7      (1) URINALYSIS (will reflex a microscopy if leukocytes, occult blood, protein or nitrites are not within normal limits) 94XPB4026 09:09AM Terrance Sousa      Test Name Result Flag Reference   COLOR Yellow     CLARITY Clear     SPECIFIC GRAVITY UA <=1 005  1 003-1 030   PH UA 6 0  4 5-8 0   LEUKOCYTE ESTERASE UA Negative  Negative   NITRITE UA Negative  Negative   PROTEIN UA Negative mg/dl  Negative   GLUCOSE UA Negative mg/dl  Negative   KETONES UA Negative mg/dl  Negative   UROBILINOGEN UA 0 2 E U /dl  0 2, 1 0 E U /dl   BILIRUBIN UA Negative  Negative   BLOOD UA Negative  Negative      (1) URINE PROTEIN CREATININE RATIO 19SFZ2677 09:09AM Wiliam Gamez      Test Name Result Flag Reference   CREATININE URINE 43 9 mg/dL     URINE PROTEIN:CREATININE RATIO 0 21 H 0 00-0 10   URINE PROTEIN 9 mg/dL        (1) PROTEIN ELECTRO, SERUM 99YDF5413 09:09AM Terrance Sousa      Test Name Result Flag Reference   A/G RATIO 1 06 L 1 10-1 80   Albumin 51 4 % L 52 0-65 0   Albumin Conc  4 11 g/dl  3 50-5 00   Alpha 1 Conc  0 40 g/dL  0 10-0 40   ALPHA 1 5 0 %  2 5-5 0   Alpha 2 Conc  0 98 g/dL  0 40-1 20   ALPHA 2 12 2 %  7 0-13 0   Beta 1 Conc  0 57 g/dL  0 40-0 80   BETA-1 7 1 %  5 0-13 0   Beta 2 Conc 0 74 g/dL H 0 20-0 50   BETA-2 9 3 % H 2 0-8 0   Gamma Conc 1 20 g/dL  0 50-1 60   GAMMA GLOBULIN 15 0 %  12 0-22 0   Interpretation      The serum total protein and albumin are within normal limits  The serum protein electrophoresis shows an increased beta-2 region  No monoclonal bands noted  Reviewed by: Fabian Smith MD ([id number]) **Electronic Signature**   TOTAL PROTEIN  8 0 g/dL  6 4-8 2      (1) PROTEIN ELECTRO, URINE 14PXO5063 09:09AM Wiliam Gamez   TW Order Number: [id number]      Test Name Result Flag Reference   ALPHA 1 URINE 0 0 %     ALPHA 2 URINE 0 0 %     BETA URINE 0 0 %     GAMMA GLOBULIN URINE 0 0 %     UPEP INTERPRETATION      The urine total protein and electrophoresis are within normal limits  No monoclonal bands noted  Reviewed by: Prosper Ascencio MD ([id number]) **Electronic Signature**   ALBUMIN URINE  0 %     URINE PROTEIN 12 0 mg/dL  2 0-17 5        Signatures    Electronically signed by : QUYEN Prieto ; Jan 16 2018  3:59PM EST                       (Author)

## 2018-01-22 VITALS
WEIGHT: 254 LBS | HEIGHT: 66 IN | SYSTOLIC BLOOD PRESSURE: 130 MMHG | TEMPERATURE: 97.9 F | HEART RATE: 80 BPM | DIASTOLIC BLOOD PRESSURE: 70 MMHG | BODY MASS INDEX: 40.82 KG/M2 | RESPIRATION RATE: 16 BRPM

## 2018-01-22 VITALS — TEMPERATURE: 98.2 F

## 2018-01-23 VITALS
SYSTOLIC BLOOD PRESSURE: 130 MMHG | HEART RATE: 80 BPM | RESPIRATION RATE: 16 BRPM | WEIGHT: 260 LBS | DIASTOLIC BLOOD PRESSURE: 80 MMHG | TEMPERATURE: 99 F | BODY MASS INDEX: 41.96 KG/M2

## 2018-03-07 NOTE — PROGRESS NOTES
History of Present Illness    Revaccination   Vaccine Information: Vaccine(s) Given (names): Cari Jean [id number]  Vaccine(s) Given (names): pneumovax [id number]  Spoke with patient regarding vaccine out of temperature range  Action(s): Pt will be revaccinated  Appointment scheduled: [id number] sd  Revaccination Completed: [id number]  Active Problems    1  Anticoagulated by anticoagulation treatment (V58 61) (Z79 01)   2  Arthralgia (719 40)   3  Benign essential hypertension (401 1) (I10)   4  Breast CA (174 9) (C50 919)   5  Cerebrovascular accident, embolic (718 63) (V58 3)   6  Chronic obstructive pulmonary disease (496) (J44 9)   7  Depression (311) (F32 9)   8  Exogenous obesity (278 00) (E66 9)   9  Flu vaccine need (V04 81) (Z23)   10  Generalized osteoarthritis (715 00) (M15 9)   11  Hyperlipidemia (272 4) (E78 5)   12  Mitral valve disorder (424 0) (I05 9)   13  Need for revaccination (V05 9) (Z23)   14  Paroxysmal a-fib (427 31) (I48 0)   15  Stroke syndrome (434 91) (I63 9)   16  Type 2 diabetes mellitus (250 00) (E11 9)   17  Visit for screening mammogram (V76 12) (Z12 31)    Immunizations  Influenza --- [facility]: [id number] Night: 27-Dec-2016   Meningococcal --- [facility]: Hold For Documentation, 11-Jun-2014; Elena Clayton: Hold For Documentation,  11-Jun-2014   PPSV --- [facility]: Hold For Documentation, 11-Jun-2014; Elena Clayton: Hold For Documentation,  11-Jun-2014   Tdap --- [facility]: Unknown     Current Meds   1  Accu-Chek Regi Plus In Vitro Strip; TEST AS DIRECTED THREE TIMES DAILY   2  Ciclopirox Olamine 0 77 % External Cream; APPLY INCH  PRN   3  Citalopram Hydrobromide 40 MG Oral Tablet; TAKE 1 TABLET DAILY   4  Combigan 0 2-0 5 % Ophthalmic Solution; INSTILL 1 DROP Daily EACH EYE   5  Dilt- MG Oral Capsule Extended Release 24 Hour; TAKE 1 CAPSULE DAILY   6  Eliquis 5 MG Oral Tablet; Take 1 tablet twice daily   7   Irbesartan 150 MG Oral Tablet; TAKE 1 TABLET ONE TIME DAILY   8  Mag- (64 Mg) MG TBCR; TAKE 1 TABLET 3 times daily   9  MetFORMIN HCl - 500 MG Oral Tablet; TAKE 1 TABLET EVERY MORNING AND TAKE 2   TABLETS IN THE EVENING   10  Metoprolol Tartrate 25 MG Oral Tablet; take 1 tablet every day   11  Simvastatin 20 MG Oral Tablet; TAKE 1 TABLET DAILY    Allergies    1  No Known Drug Allergies    Future Appointments    Date/Time Provider Specialty Site   04/03/2017 09:00 AM QUYEN Durant   Cardiology [facility]   05/10/2017 08:15 AM Felisha Gordon DO Internal Medicine [facility]     Signatures   Electronically signed by : David De La Vega DO; Feb 15 2017  5:44PM EST                       (Author)

## 2018-03-31 DIAGNOSIS — I10 HYPERTENSION, ESSENTIAL: Primary | ICD-10-CM

## 2018-04-14 ENCOUNTER — TELEPHONE (OUTPATIENT)
Dept: CARDIOLOGY CLINIC | Facility: CLINIC | Age: 73
End: 2018-04-14

## 2018-04-14 DIAGNOSIS — E78.2 COMBINED HYPERLIPIDEMIA: Primary | ICD-10-CM

## 2018-04-14 RX ORDER — SIMVASTATIN 20 MG
TABLET ORAL
Qty: 90 TABLET | Refills: 3 | Status: SHIPPED | OUTPATIENT
Start: 2018-04-14 | End: 2018-04-17 | Stop reason: SDUPTHER

## 2018-04-17 DIAGNOSIS — E78.2 COMBINED HYPERLIPIDEMIA: ICD-10-CM

## 2018-04-17 RX ORDER — SIMVASTATIN 20 MG
20 TABLET ORAL DAILY
Qty: 90 TABLET | Refills: 3 | Status: SHIPPED | OUTPATIENT
Start: 2018-04-17 | End: 2018-05-03 | Stop reason: SDUPTHER

## 2018-04-26 DIAGNOSIS — I10 ESSENTIAL HYPERTENSION: Primary | ICD-10-CM

## 2018-04-26 RX ORDER — DILTIAZEM HYDROCHLORIDE 240 MG/1
CAPSULE, EXTENDED RELEASE ORAL
Qty: 90 CAPSULE | Refills: 3 | Status: SHIPPED | OUTPATIENT
Start: 2018-04-26 | End: 2019-03-04 | Stop reason: SDUPTHER

## 2018-05-02 RX ORDER — NYSTATIN 100000 [USP'U]/G
POWDER TOPICAL
COMMUNITY
Start: 2017-11-20 | End: 2019-01-31 | Stop reason: ALTCHOICE

## 2018-05-02 RX ORDER — IRBESARTAN 150 MG/1
TABLET ORAL
COMMUNITY
Start: 2014-04-24 | End: 2018-06-08 | Stop reason: SDUPTHER

## 2018-05-02 RX ORDER — BRIMONIDINE TARTRATE, TIMOLOL MALEATE 2; 5 MG/ML; MG/ML
1 SOLUTION/ DROPS OPHTHALMIC DAILY
COMMUNITY
Start: 2014-06-13

## 2018-05-02 RX ORDER — CITALOPRAM 40 MG/1
1 TABLET ORAL DAILY
COMMUNITY
Start: 2014-04-24 | End: 2018-05-03 | Stop reason: SDUPTHER

## 2018-05-03 ENCOUNTER — OFFICE VISIT (OUTPATIENT)
Dept: INTERNAL MEDICINE CLINIC | Facility: CLINIC | Age: 73
End: 2018-05-03
Payer: MEDICARE

## 2018-05-03 VITALS
HEART RATE: 84 BPM | SYSTOLIC BLOOD PRESSURE: 132 MMHG | DIASTOLIC BLOOD PRESSURE: 80 MMHG | HEIGHT: 66 IN | OXYGEN SATURATION: 97 %

## 2018-05-03 DIAGNOSIS — I48.0 PAROXYSMAL A-FIB (HCC): ICD-10-CM

## 2018-05-03 DIAGNOSIS — F32.A ANXIETY AND DEPRESSION: Primary | ICD-10-CM

## 2018-05-03 DIAGNOSIS — E78.2 COMBINED HYPERLIPIDEMIA: ICD-10-CM

## 2018-05-03 DIAGNOSIS — R80.9 PROTEINURIA, UNSPECIFIED TYPE: ICD-10-CM

## 2018-05-03 DIAGNOSIS — F41.9 ANXIETY AND DEPRESSION: Primary | ICD-10-CM

## 2018-05-03 DIAGNOSIS — E11.8 TYPE 2 DIABETES MELLITUS WITH COMPLICATION, WITHOUT LONG-TERM CURRENT USE OF INSULIN (HCC): ICD-10-CM

## 2018-05-03 DIAGNOSIS — E78.2 MIXED HYPERLIPIDEMIA: ICD-10-CM

## 2018-05-03 DIAGNOSIS — F32.A DEPRESSION, UNSPECIFIED DEPRESSION TYPE: ICD-10-CM

## 2018-05-03 DIAGNOSIS — E66.9 OBESITY, UNSPECIFIED CLASSIFICATION, UNSPECIFIED OBESITY TYPE, UNSPECIFIED WHETHER SERIOUS COMORBIDITY PRESENT: ICD-10-CM

## 2018-05-03 DIAGNOSIS — I10 HYPERTENSION, ESSENTIAL: ICD-10-CM

## 2018-05-03 DIAGNOSIS — I10 BENIGN ESSENTIAL HYPERTENSION: ICD-10-CM

## 2018-05-03 PROCEDURE — 99215 OFFICE O/P EST HI 40 MIN: CPT | Performed by: INTERNAL MEDICINE

## 2018-05-03 RX ORDER — SIMVASTATIN 20 MG
20 TABLET ORAL DAILY
Qty: 90 TABLET | Refills: 3 | Status: SHIPPED | OUTPATIENT
Start: 2018-05-03 | End: 2019-05-23 | Stop reason: SDUPTHER

## 2018-05-03 RX ORDER — CITALOPRAM 40 MG/1
20 TABLET ORAL DAILY
Qty: 90 TABLET | Refills: 1 | Status: SHIPPED | OUTPATIENT
Start: 2018-05-03 | End: 2018-12-12 | Stop reason: SDUPTHER

## 2018-05-03 NOTE — PROGRESS NOTES
Assessment/Plan:  Regarding her blood sugar she is going to bronch drop off a list of blood sugars  She will continue her metformin and check an A1c  Regarding her paroxysmal atrial fibrillation she is in sinus rhythm and is anticoagulated  Her depression is under reasonably good control the present time  She has hyperlipidemia which will be checked in the next few days  Her proteinuria was recently evaluated by Nephrology  Her blood pressure is stable at the present time  I will see her in 4-5 months  She will do her labs now and again in 4-5 months  All questions were answered    No results found for this or any previous visit (from the past 1008 hour(s))  1  Anxiety and depression  citalopram (CeleXA) 40 mg tablet   2  Combined hyperlipidemia  simvastatin (ZOCOR) 20 mg tablet    Basic metabolic panel    Lipid panel   3  Hypertension, essential  metoprolol tartrate (LOPRESSOR) 25 mg tablet   4  Type 2 diabetes mellitus with complication, without long-term current use of insulin (HCC)  Basic metabolic panel    HEMOGLOBIN A1C W/ EAG ESTIMATION    HEMOGLOBIN A1C W/ EAG ESTIMATION   5  Benign essential hypertension     6  Paroxysmal A-fib ([address] )     7  Depression, unspecified depression type     8  Mixed hyperlipidemia  Lipid panel   9  Obesity, unspecified classification, unspecified obesity type, unspecified whether serious comorbidity present     10  Proteinuria, unspecified type         Orders Placed This Encounter   Procedures    Basic metabolic panel    HEMOGLOBIN A1C W/ EAG ESTIMATION    Lipid panel    Basic metabolic panel    HEMOGLOBIN A1C W/ EAG ESTIMATION    Lipid panel         Subjective: The patient's problems are multiple and include 1  Breast cancer 2  Paroxysmal atrial fibrillation 3  Hyperlipidemia 4  Type 2 diabetes 5  Obesity 6  Proteinuria 7  Depression     Patient ID: Osmani Schafer is a 81 y o  female  HPI she comes in for follow-up    She says she has been feeling fairly well  She tends to minimize any symptoms  She has always had some difficulty with family issues  Denies any significant depression at the present time  Did not bring a list of blood sugars says they are in the low 100s  Has a history of depression  Has a history of paroxysmal atrial fibrillation  Is anticoagulated  She is followed by Cardiology  The following portions of the patient's history were reviewed and updated as appropriate:   She has a past medical history of Elevation of level of transaminase and lactic acid dehydrogenase (LDH); Hypercholesterolemia; Malignant neoplasm of female breast ([facility] ); Pulmonary edema; Septicemia ([facility] ); Splenic disorder; and Systemic inflammatory response syndrome (SIRS) due to infectious process ([facility] )  ,   does not have any pertinent problems on file  ,   has a past surgical history that includes Breast reconstruction (Bilateral); Mastectomy; Replacement total knee (Right); Splenectomy; and Tonsillectomy  ,  family history includes Breast cancer in her family; Diabetes in her mother; Kidney disease in her mother  ,   reports that she has never smoked  She has never used smokeless tobacco  She reports that she does not drink alcohol or use drugs  ,  has No Known Allergies       Current Outpatient Prescriptions:     apixaban (ELIQUIS) 5 mg, Take 1 tablet by mouth 2 (two) times a day, Disp: , Rfl:     brimonidine-timolol (COMBIGAN) 0 2-0 5 %, Apply 1 drop to eye daily, Disp: , Rfl:     citalopram (CeleXA) 40 mg tablet, Take 0 5 tablets (20 mg total) by mouth daily, Disp: 90 tablet, Rfl: 1    DILT- MG 24 hr capsule, TAKE 1 CAPSULE EVERY DAY, Disp: 90 capsule, Rfl: 3    glucose blood (ACCU-CHEK JAMARCUS PLUS) test strip, by In Vitro route, Disp: , Rfl:     irbesartan (AVAPRO) 150 mg tablet, Take by mouth, Disp: , Rfl:     magnesium chloride (MAG-DELAY) 535 mg, Take 1 tablet by mouth 3 (three) times a day, Disp: , Rfl:     metFORMIN (GLUCOPHAGE) 500 mg tablet, Take by mouth, Disp: , Rfl:     metoprolol tartrate (LOPRESSOR) 25 mg tablet, Take 1 tablet (25 mg total) by mouth daily, Disp: 90 tablet, Rfl: 3    nystatin (MYCOSTATIN) powder, Apply topically, Disp: , Rfl:     RESTASIS MULTIDOSE 0 05 % ophthalmic emulsion, , Disp: , Rfl:     simvastatin (ZOCOR) 20 mg tablet, Take 1 tablet (20 mg total) by mouth daily, Disp: 90 tablet, Rfl: 3    Review of Systems   Constitutional: Negative for activity change, appetite change, chills, diaphoresis, fatigue, fever and unexpected weight change  HENT: Negative for congestion, ear pain, hearing loss, mouth sores, nosebleeds, postnasal drip, sinus pain, sinus pressure, sore throat and trouble swallowing  Eyes: Negative for pain, discharge and visual disturbance  Respiratory: Negative for apnea, cough, chest tightness, shortness of breath and wheezing  Cardiovascular: Negative for chest pain, palpitations and leg swelling  History of paroxysmal atrial fibrillation  Gastrointestinal: Negative for abdominal pain, anal bleeding, blood in stool, constipation, diarrhea, nausea and vomiting  Endocrine: Negative for polydipsia and polyphagia  She takes metformin for her blood sugars  She only checks a month day  She did not bring a list    Genitourinary: Negative for decreased urine volume, dysuria, flank pain, frequency, hematuria and urgency  Musculoskeletal: Negative for arthralgias, back pain, gait problem, joint swelling and myalgias  Skin: Negative for rash and wound  Allergic/Immunologic: Negative for environmental allergies and food allergies  Neurological: Negative for dizziness, tremors, seizures, syncope, speech difficulty, light-headedness, numbness and headaches  Hematological: Negative for adenopathy  Does not bruise/bleed easily  Psychiatric/Behavioral: Negative for agitation, confusion, hallucinations, sleep disturbance and suicidal ideas  The patient is not nervous/anxious  Objective:  Vitals:    05/03/18 1049   BP: 132/80   Pulse: 84   SpO2: 97%     There is no height or weight on file to calculate BMI  Physical Exam   Constitutional: She appears well-developed and well-nourished  No distress  She is markedly overweight  She refused to be weighed  HENT:   Head: Normocephalic  Right Ear: External ear normal    Left Ear: External ear normal    Nose: Nose normal    Mouth/Throat: Oropharynx is clear and moist  No oropharyngeal exudate  Eyes: Conjunctivae and EOM are normal  Pupils are equal, round, and reactive to light  Right eye exhibits no discharge  Left eye exhibits no discharge  Neck: Normal range of motion  Neck supple  No thyromegaly present  Cardiovascular: Normal rate, regular rhythm, normal heart sounds and intact distal pulses  Exam reveals no gallop and no friction rub  No murmur heard  Rhythm is regular at the present time  History of atrial fibrillation  Pulmonary/Chest: Effort normal and breath sounds normal  No respiratory distress  She has no wheezes  She has no rales  She has bilateral mastectomies  Abdominal: Soft  Bowel sounds are normal  She exhibits no distension and no mass  There is no tenderness  There is no rebound and no guarding  Musculoskeletal: Normal range of motion  She exhibits no edema, tenderness or deformity  Lymphadenopathy:     She has no cervical adenopathy  Neurological: She is alert  She has normal reflexes  No cranial nerve deficit  Coordination normal    Skin: Skin is warm and dry  No rash noted  No erythema  Foot exam reveals normal sent take shin intact peripheral pulses  Psychiatric: She has a normal mood and affect  Her behavior is normal  Judgment and thought content normal    Nursing note and vitals reviewed

## 2018-05-07 ENCOUNTER — APPOINTMENT (OUTPATIENT)
Dept: LAB | Facility: CLINIC | Age: 73
End: 2018-05-07
Payer: MEDICARE

## 2018-05-07 DIAGNOSIS — E11.8 TYPE 2 DIABETES MELLITUS WITH COMPLICATION, WITHOUT LONG-TERM CURRENT USE OF INSULIN (HCC): ICD-10-CM

## 2018-05-07 DIAGNOSIS — E78.2 MIXED HYPERLIPIDEMIA: ICD-10-CM

## 2018-05-07 DIAGNOSIS — E78.2 COMBINED HYPERLIPIDEMIA: ICD-10-CM

## 2018-05-07 LAB
ANION GAP SERPL CALCULATED.3IONS-SCNC: 8 MMOL/L (ref 4–13)
BUN SERPL-MCNC: 14 MG/DL (ref 5–25)
CALCIUM SERPL-MCNC: 9 MG/DL (ref 8.3–10.1)
CHLORIDE SERPL-SCNC: 107 MMOL/L (ref 100–108)
CHOLEST SERPL-MCNC: 192 MG/DL (ref 50–200)
CO2 SERPL-SCNC: 24 MMOL/L (ref 21–32)
CREAT SERPL-MCNC: 1 MG/DL (ref 0.6–1.3)
EST. AVERAGE GLUCOSE BLD GHB EST-MCNC: 151 MG/DL
GFR SERPL CREATININE-BSD FRML MDRD: 57 ML/MIN/1.73SQ M
GLUCOSE P FAST SERPL-MCNC: 131 MG/DL (ref 65–99)
HBA1C MFR BLD: 6.9 % (ref 4.2–6.3)
HDLC SERPL-MCNC: 66 MG/DL (ref 40–60)
LDLC SERPL CALC-MCNC: 95 MG/DL (ref 0–100)
NONHDLC SERPL-MCNC: 126 MG/DL
POTASSIUM SERPL-SCNC: 4.1 MMOL/L (ref 3.5–5.3)
SODIUM SERPL-SCNC: 139 MMOL/L (ref 136–145)
TRIGL SERPL-MCNC: 153 MG/DL

## 2018-05-07 PROCEDURE — 36415 COLL VENOUS BLD VENIPUNCTURE: CPT

## 2018-05-07 PROCEDURE — 80061 LIPID PANEL: CPT

## 2018-05-07 PROCEDURE — 80048 BASIC METABOLIC PNL TOTAL CA: CPT

## 2018-05-07 PROCEDURE — 83036 HEMOGLOBIN GLYCOSYLATED A1C: CPT

## 2018-05-30 DIAGNOSIS — E11.9 TYPE 2 DIABETES MELLITUS WITHOUT COMPLICATION, WITHOUT LONG-TERM CURRENT USE OF INSULIN (HCC): Primary | ICD-10-CM

## 2018-06-08 DIAGNOSIS — I10 ESSENTIAL HYPERTENSION: Primary | ICD-10-CM

## 2018-06-08 RX ORDER — IRBESARTAN 150 MG/1
TABLET ORAL
Qty: 90 TABLET | Refills: 3 | Status: SHIPPED | OUTPATIENT
Start: 2018-06-08 | End: 2018-06-18 | Stop reason: SDUPTHER

## 2018-06-18 ENCOUNTER — TELEPHONE (OUTPATIENT)
Dept: INTERNAL MEDICINE CLINIC | Facility: CLINIC | Age: 73
End: 2018-06-18

## 2018-06-18 DIAGNOSIS — I10 ESSENTIAL HYPERTENSION: ICD-10-CM

## 2018-06-18 RX ORDER — IRBESARTAN 150 MG/1
150 TABLET ORAL DAILY
Qty: 30 TABLET | Refills: 0 | Status: SHIPPED | OUTPATIENT
Start: 2018-06-18 | End: 2019-05-23 | Stop reason: SDUPTHER

## 2018-06-18 NOTE — TELEPHONE ENCOUNTER
Patient called to see if you could please send a script into her local pharmacy ([facility]) [address] for three days supply until she gets her script from her mail order pharmacy?     (Irbesartan 150 mg tablet)

## 2018-06-20 NOTE — TELEPHONE ENCOUNTER
Spoke with pt instructions as per [facility] given  the patient will speak with Dr Sabine Fonseca when he returns

## 2018-06-20 NOTE — TELEPHONE ENCOUNTER
NO, at the last visit on May 3rd, he said her blood pressure was stable and the irbesartan was listed as once a day  That is also how he renewed it to the mail-order  When she called and asked for the medicine to be filled to her local pharmacy until she received her mail-order in the mail, she only asked for 3 days worth  That would have been 6 pills, I gave her # 27  I do not see any evidence that she was told to take 2 pills daily, he did not say it in his office note  This will have to wait until Dr Salinas Giles comes back so that he can verify this and correct the medication listed in the chart  Until then, if she thinks she was told to take 2 pills a day, she has enough from what I filled for her  However, if she had been taking 1 pill daily at the time of her last office visit, her blood pressure was stable on that amount and she should not increase it to 2 pills daily

## 2018-06-20 NOTE — TELEPHONE ENCOUNTER
Clarisa Collier from [facility] called and said that she received the script for Avapro but patient told her that Dr Wagner Melissa told her to take two a day not one   Could this be called in to [id number] [facility] @ [phone]

## 2018-10-15 ENCOUNTER — OFFICE VISIT (OUTPATIENT)
Dept: CARDIOLOGY CLINIC | Facility: CLINIC | Age: 73
End: 2018-10-15
Payer: MEDICARE

## 2018-10-15 VITALS
DIASTOLIC BLOOD PRESSURE: 68 MMHG | HEART RATE: 79 BPM | SYSTOLIC BLOOD PRESSURE: 116 MMHG | WEIGHT: 257.4 LBS | HEIGHT: 66 IN | OXYGEN SATURATION: 97 % | BODY MASS INDEX: 41.37 KG/M2

## 2018-10-15 DIAGNOSIS — I48.0 PAROXYSMAL A-FIB (HCC): Primary | ICD-10-CM

## 2018-10-15 DIAGNOSIS — Z79.01 CHRONIC ANTICOAGULATION: ICD-10-CM

## 2018-10-15 DIAGNOSIS — I10 BENIGN ESSENTIAL HYPERTENSION: ICD-10-CM

## 2018-10-15 DIAGNOSIS — E78.2 MIXED HYPERLIPIDEMIA: ICD-10-CM

## 2018-10-15 PROCEDURE — 99214 OFFICE O/P EST MOD 30 MIN: CPT | Performed by: INTERNAL MEDICINE

## 2018-10-15 NOTE — PROGRESS NOTES
[facility]  [address] [address]  Cardiology Follow Up    Cameri  1945  [id number]      1  [facility] )     2  Benign essential hypertension     3  Mixed hyperlipidemia     4  Chronic anticoagulation         Chief Complaint   Patient presents with    Palpitations       Interval History:   Patient presents for follow-up visit  Patient has history of paroxysmal atrial fibrillation on anticoagulation  Patient also has history of obesity hyperlipidemia  Patient has occasional symptoms of palpitations  Patient denies any history of dizziness lightheadedness or syncope  Patient states that she has been compliant with all her present medications  No bleeding issues  Patient is regularly followed by her primary care physician  Patient Active Problem List   Diagnosis    Benign essential hypertension    Breast CA ([facility] )    Depression    Hyperlipidemia    Obesity    Paroxysmal A-fib (HCC)    Proteinuria    Type 2 diabetes mellitus (HCC)    Chronic anticoagulation     Past Medical History:   Diagnosis Date    Atrial fibrillation ([facility] )     Diabetes mellitus ([facility] )     Elevation of level of transaminase and lactic acid dehydrogenase (LDH)     Hypercholesterolemia     Last assessed: 6/11/14    Hypercholesterolemia     Malignant neoplasm of female breast ([facility] )     Pulmonary edema     Septicemia (HCC)     Splenic disorder     Systemic inflammatory response syndrome (SIRS) due to infectious process [facility])      Social History     Social History    Marital status:       Spouse name: N/A    Number of children: N/A    Years of education: N/A     Occupational History    Retired      Social History Main Topics    Smoking status: Never Smoker    Smokeless tobacco: Never Used    Alcohol use No    Drug use: No    Sexual activity: Not Currently     Partners: Male     Other Topics Concern    Not on file     Social History Narrative Always uses seatbelt    Living independently w/spouse          Family History   Problem Relation Age of Onset    Diabetes Mother         Mellitus    Kidney disease Mother     Breast cancer Family      Past Surgical History:   Procedure Laterality Date    BREAST RECONSTRUCTION Bilateral     w/implant prosthesis    MASTECTOMY      REPLACEMENT TOTAL KNEE Right     SPLENECTOMY      TONSILLECTOMY         Current Outpatient Prescriptions:     apixaban (ELIQUIS) 5 mg, Take 1 tablet by mouth 2 (two) times a day, Disp: , Rfl:     brimonidine-timolol (COMBIGAN) 0 2-0 5 %, Apply 1 drop to eye daily, Disp: , Rfl:     citalopram (CeleXA) 40 mg tablet, Take 0 5 tablets (20 mg total) by mouth daily, Disp: 90 tablet, Rfl: 1    DILT- MG 24 hr capsule, TAKE 1 CAPSULE EVERY DAY, Disp: 90 capsule, Rfl: 3    glucose blood (ACCU-CHEK JAMARCUS PLUS) test strip, by In Vitro route, Disp: , Rfl:     irbesartan (AVAPRO) 150 mg tablet, Take 1 tablet (150 mg total) by mouth daily, Disp: 30 tablet, Rfl: 0    magnesium chloride (MAG-DELAY) 535 mg, Take 1 tablet by mouth 3 (three) times a day, Disp: , Rfl:     metFORMIN (GLUCOPHAGE) 500 mg tablet, TAKE 1 TABLET EVERY MORNING AND TAKE 2 TABLETS IN THE EVENING, Disp: 270 tablet, Rfl: 4    metoprolol tartrate (LOPRESSOR) 25 mg tablet, Take 1 tablet (25 mg total) by mouth daily, Disp: 90 tablet, Rfl: 3    nystatin (MYCOSTATIN) powder, Apply topically, Disp: , Rfl:     RESTASIS MULTIDOSE 0 05 % ophthalmic emulsion, , Disp: , Rfl:     simvastatin (ZOCOR) 20 mg tablet, Take 1 tablet (20 mg total) by mouth daily, Disp: 90 tablet, Rfl: 3  No Known Allergies    Labs:  No visits with results within 2 Month(s) from this visit     Latest known visit with results is:   Appointment on 05/07/2018   Component Date Value    Sodium 05/07/2018 139     Potassium 05/07/2018 4 1     Chloride 05/07/2018 107     CO2 05/07/2018 24     ANION GAP 05/07/2018 8     BUN 05/07/2018 14     Creatinine 05/07/2018 1 00     Glucose, Fasting 05/07/2018 131*    Calcium 05/07/2018 9 0     eGFR 05/07/2018 57     Hemoglobin A1C 05/07/2018 6 9*    EAG 05/07/2018 151     Cholesterol 05/07/2018 192     Triglycerides 05/07/2018 153*    HDL, Direct 05/07/2018 66*    LDL Calculated 05/07/2018 95     Non-HDL-Chol (CHOL-HDL) 05/07/2018 126      Imaging: No results found  Review of Systems:  Review of Systems   REVIEW OF SYSTEMS:  Constitutional:  Denies fever or chills   Eyes:  Denies change in visual acuity   HENT:  Denies nasal congestion or sore throat   Respiratory:  Denies cough or shortness of breath   Cardiovascular:  Denies chest pain or edema   GI:  Denies abdominal pain, nausea, vomiting, bloody stools or diarrhea   :  Denies dysuria, frequency, difficulty in micturition and nocturia  Musculoskeletal:  Denies back pain or joint pain   Neurologic:  Denies headache, focal weakness or sensory changes   Endocrine:  Denies polyuria or polydipsia   Lymphatic:  Denies swollen glands   Psychiatric:   anxiety     Physical Exam:    /68   Pulse 79   Ht 5' 6" (1 676 m)   Wt 117 kg (257 lb 6 4 oz)   SpO2 97%   BMI 41 55 kg/m²     Physical Exam   PHYSICAL EXAM:  General:  Patient is not in acute distress   Head: Normocephalic, Atraumatic  HEENT:  Both pupils normal-size atraumatic, normocephalic, nonicteric  Neck:  JVP not raised  Trachea central  No carotid bruit  Respiratory:  normal breath sounds no crackles  no rhonchi  Cardiovascular:  Regular rate and rhythm no S3 no murmurs  GI:  Abdomen soft nontender  No organomegaly  Lymphatic:  No cervical or inguinal lymphadenopathy  Neurologic:  Patient is awake alert, oriented   Grossly nonfocal    Discussion/Summary:  Patient with multiple medical problems who seems to be doing reasonably well from cardiac standpoint  Previous studies reviewed with patient  Medications reviewed and possible side effects discussed   concepts of cardiovascular disease , signs and symptoms of heart disease  Dietary and risk factor modification reinforced  All questions answered  Safety measures reviewed  Patient advised to report any problems prompting medical attention  Patient understands the risks and benefits of anticoagulation to prevent thromboembolic risk from atrial fibrillation  Patient report any bleeding issues  Given symptoms of palpitations patient will be scheduled for a Holter monitor to assess heart rate response  Patient will also be scheduled for an echocardiogram to assess ejection fraction valves and look for evidence of pulmonary hypertension  Symptoms to watch out from cardiac standpoint discussed  Patient report any bleeding issues  Patient had a few questions which were answered  Follow-up in 6 months    Follow-up with primary care physician

## 2018-10-31 ENCOUNTER — HOSPITAL ENCOUNTER (OUTPATIENT)
Dept: NON INVASIVE DIAGNOSTICS | Facility: CLINIC | Age: 73
Discharge: HOME | End: 2018-10-31
Payer: MEDICARE

## 2018-10-31 DIAGNOSIS — I48.0 PAROXYSMAL A-FIB (HCC): ICD-10-CM

## 2018-10-31 PROCEDURE — 93226 XTRNL ECG REC<48 HR SCAN A/R: CPT

## 2018-10-31 PROCEDURE — 93225 XTRNL ECG REC<48 HRS REC: CPT

## 2018-10-31 PROCEDURE — 93306 TTE W/DOPPLER COMPLETE: CPT | Performed by: INTERNAL MEDICINE

## 2018-10-31 PROCEDURE — C8929 TTE W OR WO FOL WCON,DOPPLER: HCPCS

## 2018-10-31 RX ADMIN — PERFLUTREN 0.4 ML/MIN: 6.52 INJECTION, SUSPENSION INTRAVENOUS at 09:27

## 2018-11-06 PROCEDURE — 93227 XTRNL ECG REC<48 HR R&I: CPT | Performed by: INTERNAL MEDICINE

## 2018-12-12 ENCOUNTER — OFFICE VISIT (OUTPATIENT)
Dept: INTERNAL MEDICINE CLINIC | Facility: CLINIC | Age: 73
End: 2018-12-12
Payer: MEDICARE

## 2018-12-12 VITALS
OXYGEN SATURATION: 96 % | SYSTOLIC BLOOD PRESSURE: 122 MMHG | BODY MASS INDEX: 41.55 KG/M2 | HEIGHT: 66 IN | DIASTOLIC BLOOD PRESSURE: 78 MMHG | HEART RATE: 108 BPM

## 2018-12-12 DIAGNOSIS — E11.8 TYPE 2 DIABETES MELLITUS WITH COMPLICATION, WITHOUT LONG-TERM CURRENT USE OF INSULIN (HCC): ICD-10-CM

## 2018-12-12 DIAGNOSIS — F32.A DEPRESSION, UNSPECIFIED DEPRESSION TYPE: ICD-10-CM

## 2018-12-12 DIAGNOSIS — Z79.01 CHRONIC ANTICOAGULATION: ICD-10-CM

## 2018-12-12 DIAGNOSIS — E66.9 OBESITY, UNSPECIFIED CLASSIFICATION, UNSPECIFIED OBESITY TYPE, UNSPECIFIED WHETHER SERIOUS COMORBIDITY PRESENT: ICD-10-CM

## 2018-12-12 DIAGNOSIS — I10 BENIGN ESSENTIAL HYPERTENSION: Primary | ICD-10-CM

## 2018-12-12 DIAGNOSIS — R80.9 PROTEINURIA, UNSPECIFIED TYPE: ICD-10-CM

## 2018-12-12 DIAGNOSIS — F32.A ANXIETY AND DEPRESSION: ICD-10-CM

## 2018-12-12 DIAGNOSIS — E78.2 MIXED HYPERLIPIDEMIA: ICD-10-CM

## 2018-12-12 DIAGNOSIS — I48.0 PAROXYSMAL A-FIB (HCC): ICD-10-CM

## 2018-12-12 DIAGNOSIS — F41.9 ANXIETY AND DEPRESSION: ICD-10-CM

## 2018-12-12 PROCEDURE — 99215 OFFICE O/P EST HI 40 MIN: CPT | Performed by: INTERNAL MEDICINE

## 2018-12-12 RX ORDER — CITALOPRAM 20 MG/1
TABLET ORAL
Qty: 90 TABLET | Refills: 1 | Status: SHIPPED | OUTPATIENT
Start: 2018-12-12 | End: 2019-06-03 | Stop reason: SDUPTHER

## 2018-12-12 NOTE — PROGRESS NOTES
Assessment/Plan:  Regarding depression she declines counseling  We elected to put her back on Celexa 20 mg per day  Side effects and expectations explained  Blood sugars are stable at this point  Will get A1c today  In atrial fibrillation and anticoagulated  Will follow up with Cardiology  Has hypertension which is stable  Has osteoarthritis which she does not wish to see anybody for the present time       She remains morbidly obese and really does not stick to any diet  Also has proteinuria  Will see her back in 1 month  She will start her Celexa as soon as she gets it  If she wishes to come in before 1 month she will let me know  40 min spent with this patient in counseling physical exam and review of data  No results found for this or any previous visit (from the past 1008 hour(s))  1  Benign essential hypertension  CBC and differential    Comprehensive metabolic panel   2  Anxiety and depression  citalopram (CeleXA) 20 mg tablet   3  Type 2 diabetes mellitus with complication, without long-term current use of insulin (HCC)  HEMOGLOBIN A1C W/ EAG ESTIMATION    TSH, 3rd generation with Free T4 reflex   4  Paroxysmal A-fib ([address] )     5  Depression, unspecified depression type     6  Mixed hyperlipidemia  Lipid panel   7  Obesity, unspecified classification, unspecified obesity type, unspecified whether serious comorbidity present     8  Proteinuria, unspecified type     9  Chronic anticoagulation         Orders Placed This Encounter   Procedures    CBC and differential    Comprehensive metabolic panel    HEMOGLOBIN A1C W/ EAG ESTIMATION    Lipid panel    TSH, 3rd generation with Free T4 reflex         Subjective: This patient has multiple medical problems and is not doing well  Her biggest complaint at the moment is that she is depressed  She has had problems with depression in the past   She lost her  a couple of years ago    She is particularly depressed about this time of year and also has anxiety issues  She stopped Celexa on her own about a year ago  We did not advised her to do so  She denies any suicidal ideations  She just gets angry and has emotional lability  She wants to go back on Celexa  Other problems include diabetes obesity history of breast cancer atrial fibrillation and chronic anticoagulation  Patient ID: Alex Benoit is a 72 y o  female  HPI as above    The following portions of the patient's history were reviewed and updated as appropriate:   She has a past medical history of Atrial fibrillation ([facility] ); Diabetes mellitus ([facility] ); Elevation of level of transaminase and lactic acid dehydrogenase (LDH); Hypercholesterolemia; Hypercholesterolemia; Malignant neoplasm of female breast ([facility] ); Pulmonary edema; Septicemia ([facility] ); Splenic disorder; and Systemic inflammatory response syndrome (SIRS) due to infectious process ([facility] )  ,   does not have any pertinent problems on file  ,   has a past surgical history that includes Breast reconstruction (Bilateral); Mastectomy; Replacement total knee (Right); Splenectomy; and Tonsillectomy  ,  family history includes Breast cancer in her family; Diabetes in her mother; Kidney disease in her mother  ,   reports that she has never smoked  She has never used smokeless tobacco  She reports that she does not drink alcohol or use drugs  ,  has No Known Allergies       Current Outpatient Prescriptions:     apixaban (ELIQUIS) 5 mg, Take 1 tablet by mouth 2 (two) times a day, Disp: , Rfl:     brimonidine-timolol (COMBIGAN) 0 2-0 5 %, Apply 1 drop to eye daily, Disp: , Rfl:     citalopram (CeleXA) 20 mg tablet, Take 1 tablet daily, Disp: 90 tablet, Rfl: 1    DILT- MG 24 hr capsule, TAKE 1 CAPSULE EVERY DAY, Disp: 90 capsule, Rfl: 3    glucose blood (ACCU-CHEK JAMARCUS PLUS) test strip, by In Vitro route, Disp: , Rfl:     irbesartan (AVAPRO) 150 mg tablet, Take 1 tablet (150 mg total) by mouth daily, Disp: 30 tablet, Rfl: 0    metFORMIN (GLUCOPHAGE) 500 mg tablet, TAKE 1 TABLET EVERY MORNING AND TAKE 2 TABLETS IN THE EVENING, Disp: 270 tablet, Rfl: 4    metoprolol tartrate (LOPRESSOR) 25 mg tablet, Take 1 tablet (25 mg total) by mouth daily, Disp: 90 tablet, Rfl: 3    nystatin (MYCOSTATIN) powder, Apply topically, Disp: , Rfl:     RESTASIS MULTIDOSE 0 05 % ophthalmic emulsion, , Disp: , Rfl:     simvastatin (ZOCOR) 20 mg tablet, Take 1 tablet (20 mg total) by mouth daily, Disp: 90 tablet, Rfl: 3    magnesium chloride (MAG-DELAY) 535 mg, Take 1 tablet by mouth 3 (three) times a day, Disp: , Rfl:     Review of Systems   Constitutional: Positive for fatigue and unexpected weight change  Negative for activity change, appetite change, chills, diaphoresis and fever  HENT: Positive for ear pain  Negative for congestion, hearing loss, mouth sores, nosebleeds, postnasal drip, sinus pain, sinus pressure, sore throat and trouble swallowing  Eyes: Negative for pain, discharge and visual disturbance  Respiratory: Negative for apnea, cough, chest tightness, shortness of breath and wheezing  Cardiovascular: Negative for chest pain, palpitations and leg swelling  Has chronic atrial fibrillation   Gastrointestinal: Negative for abdominal pain, anal bleeding, blood in stool, constipation, diarrhea, nausea and vomiting  She is morbidly obese  Endocrine: Negative for polydipsia and polyphagia  Type 2 diabetes  Blood sugars under control  Genitourinary: Negative for decreased urine volume, dysuria, flank pain, frequency, hematuria and urgency  Musculoskeletal: Negative for arthralgias, back pain, gait problem, joint swelling and myalgias  Skin: Negative for rash and wound  Allergic/Immunologic: Negative for environmental allergies and food allergies  Neurological: Negative for dizziness, tremors, seizures, syncope, speech difficulty, light-headedness, numbness and headaches     Hematological: Negative for adenopathy  Does not bruise/bleed easily  Psychiatric/Behavioral: Positive for agitation  Negative for confusion, hallucinations, sleep disturbance and suicidal ideas  The patient is not nervous/anxious  She has anxiety with depression  Objective:  /78 (BP Location: Left arm, Patient Position: Sitting)   Pulse (!) 108   Ht 5' 6" (1 676 m)   SpO2 96%   BMI 41 55 kg/m²      Physical Exam   Constitutional: She appears well-developed  No distress  Obese female  Blood pressure is 122/78  Rhythm is irregularly irregular  Rate in the 70s  HENT:   Head: Normocephalic  Right Ear: External ear normal    Left Ear: External ear normal    Nose: Nose normal    Mouth/Throat: Oropharynx is clear and moist  No oropharyngeal exudate  Eyes: Pupils are equal, round, and reactive to light  Conjunctivae and EOM are normal  Right eye exhibits no discharge  Left eye exhibits no discharge  Neck: Normal range of motion  Neck supple  No thyromegaly present  Cardiovascular: Normal rate, normal heart sounds and intact distal pulses  Exam reveals no gallop and no friction rub  No murmur heard  Rhythm is irregularly irregular  Pulmonary/Chest: Effort normal and breath sounds normal  No respiratory distress  She has no wheezes  She has no rales  Abdominal: Soft  Bowel sounds are normal  She exhibits no distension and no mass  There is no tenderness  There is no rebound and no guarding  Abdomen is morbidly obese  Musculoskeletal: She exhibits no edema, tenderness or deformity  Has a right knee replacement  Has osteoarthritis of the left knee  Lymphadenopathy:     She has no cervical adenopathy  Neurological: She is alert  She has normal reflexes  No cranial nerve deficit  Coordination normal    Skin: Skin is warm and dry  No rash noted  No erythema  Psychiatric: Her behavior is normal  Judgment and thought content normal    Depressed affect present     Nursing note and vitals reviewed

## 2019-01-23 ENCOUNTER — APPOINTMENT (OUTPATIENT)
Dept: LAB | Facility: HOSPITAL | Age: 74
End: 2019-01-23
Attending: INTERNAL MEDICINE
Payer: MEDICARE

## 2019-01-23 ENCOUNTER — TELEPHONE (OUTPATIENT)
Dept: NEPHROLOGY | Facility: CLINIC | Age: 74
End: 2019-01-23

## 2019-01-23 DIAGNOSIS — E78.2 MIXED HYPERLIPIDEMIA: ICD-10-CM

## 2019-01-23 DIAGNOSIS — E11.8 TYPE 2 DIABETES MELLITUS WITH COMPLICATION, WITHOUT LONG-TERM CURRENT USE OF INSULIN (HCC): ICD-10-CM

## 2019-01-23 DIAGNOSIS — I10 BENIGN ESSENTIAL HYPERTENSION: ICD-10-CM

## 2019-01-23 DIAGNOSIS — E11.8 TYPE 2 DIABETES MELLITUS WITH COMPLICATION, UNSPECIFIED WHETHER LONG TERM INSULIN USE: Primary | ICD-10-CM

## 2019-01-23 LAB
ALBUMIN SERPL BCP-MCNC: 3.6 G/DL (ref 3.5–5)
ALP SERPL-CCNC: 153 U/L (ref 46–116)
ALT SERPL W P-5'-P-CCNC: 17 U/L (ref 12–78)
ANION GAP SERPL CALCULATED.3IONS-SCNC: 10 MMOL/L (ref 4–13)
AST SERPL W P-5'-P-CCNC: 19 U/L (ref 5–45)
BACTERIA UR QL AUTO: ABNORMAL /HPF
BASOPHILS # BLD AUTO: 0.08 THOUSANDS/ΜL (ref 0–0.1)
BASOPHILS NFR BLD AUTO: 1 % (ref 0–1)
BILIRUB SERPL-MCNC: 0.8 MG/DL (ref 0.2–1)
BILIRUB UR QL STRIP: NEGATIVE
BUN SERPL-MCNC: 16 MG/DL (ref 5–25)
CALCIUM SERPL-MCNC: 10 MG/DL (ref 8.3–10.1)
CHLORIDE SERPL-SCNC: 102 MMOL/L (ref 100–108)
CHOLEST SERPL-MCNC: 177 MG/DL (ref 50–200)
CLARITY UR: CLEAR
CO2 SERPL-SCNC: 27 MMOL/L (ref 21–32)
COLOR UR: YELLOW
CREAT SERPL-MCNC: 0.97 MG/DL (ref 0.6–1.3)
CREAT UR-MCNC: 146 MG/DL
EOSINOPHIL # BLD AUTO: 0.32 THOUSAND/ΜL (ref 0–0.61)
EOSINOPHIL NFR BLD AUTO: 3 % (ref 0–6)
ERYTHROCYTE [DISTWIDTH] IN BLOOD BY AUTOMATED COUNT: 12.9 % (ref 11.6–15.1)
GFR SERPL CREATININE-BSD FRML MDRD: 59 ML/MIN/1.73SQ M
GLUCOSE P FAST SERPL-MCNC: 131 MG/DL (ref 65–99)
GLUCOSE UR STRIP-MCNC: NEGATIVE MG/DL
HCT VFR BLD AUTO: 43.8 % (ref 34.8–46.1)
HDLC SERPL-MCNC: 68 MG/DL (ref 40–60)
HGB BLD-MCNC: 14.1 G/DL (ref 11.5–15.4)
HGB UR QL STRIP.AUTO: NEGATIVE
IMM GRANULOCYTES # BLD AUTO: 0.03 THOUSAND/UL (ref 0–0.2)
IMM GRANULOCYTES NFR BLD AUTO: 0 % (ref 0–2)
KETONES UR STRIP-MCNC: NEGATIVE MG/DL
LDLC SERPL CALC-MCNC: 82 MG/DL (ref 0–100)
LEUKOCYTE ESTERASE UR QL STRIP: NEGATIVE
LYMPHOCYTES # BLD AUTO: 1.5 THOUSANDS/ΜL (ref 0.6–4.47)
LYMPHOCYTES NFR BLD AUTO: 16 % (ref 14–44)
MCH RBC QN AUTO: 30.1 PG (ref 26.8–34.3)
MCHC RBC AUTO-ENTMCNC: 32.2 G/DL (ref 31.4–37.4)
MCV RBC AUTO: 94 FL (ref 82–98)
MONOCYTES # BLD AUTO: 0.66 THOUSAND/ΜL (ref 0.17–1.22)
MONOCYTES NFR BLD AUTO: 7 % (ref 4–12)
NEUTROPHILS # BLD AUTO: 6.82 THOUSANDS/ΜL (ref 1.85–7.62)
NEUTS SEG NFR BLD AUTO: 73 % (ref 43–75)
NITRITE UR QL STRIP: NEGATIVE
NON-SQ EPI CELLS URNS QL MICRO: ABNORMAL /HPF
NONHDLC SERPL-MCNC: 109 MG/DL
NRBC BLD AUTO-RTO: 0 /100 WBCS
PH UR STRIP.AUTO: 5.5 [PH] (ref 4.5–8)
PHOSPHATE SERPL-MCNC: 3.6 MG/DL (ref 2.3–4.1)
PLATELET # BLD AUTO: 267 THOUSANDS/UL (ref 149–390)
PMV BLD AUTO: 11.7 FL (ref 8.9–12.7)
POTASSIUM SERPL-SCNC: 4.7 MMOL/L (ref 3.5–5.3)
PROT SERPL-MCNC: 8.5 G/DL (ref 6.4–8.2)
PROT UR STRIP-MCNC: ABNORMAL MG/DL
PROT UR-MCNC: 29 MG/DL
PROT/CREAT UR: 0.2 MG/G{CREAT} (ref 0–0.1)
PTH-INTACT SERPL-MCNC: 71.6 PG/ML (ref 18.4–80.1)
RBC # BLD AUTO: 4.68 MILLION/UL (ref 3.81–5.12)
RBC #/AREA URNS AUTO: ABNORMAL /HPF
SODIUM SERPL-SCNC: 139 MMOL/L (ref 136–145)
SP GR UR STRIP.AUTO: >=1.03 (ref 1–1.03)
TRIGL SERPL-MCNC: 133 MG/DL
TSH SERPL DL<=0.05 MIU/L-ACNC: 1.5 UIU/ML (ref 0.36–3.74)
UROBILINOGEN UR QL STRIP.AUTO: 0.2 E.U./DL
WBC # BLD AUTO: 9.41 THOUSAND/UL (ref 4.31–10.16)
WBC #/AREA URNS AUTO: ABNORMAL /HPF

## 2019-01-23 PROCEDURE — 84156 ASSAY OF PROTEIN URINE: CPT | Performed by: INTERNAL MEDICINE

## 2019-01-23 PROCEDURE — 84100 ASSAY OF PHOSPHORUS: CPT | Performed by: INTERNAL MEDICINE

## 2019-01-23 PROCEDURE — 80061 LIPID PANEL: CPT

## 2019-01-23 PROCEDURE — 81001 URINALYSIS AUTO W/SCOPE: CPT | Performed by: INTERNAL MEDICINE

## 2019-01-23 PROCEDURE — 83970 ASSAY OF PARATHORMONE: CPT | Performed by: INTERNAL MEDICINE

## 2019-01-23 PROCEDURE — 85025 COMPLETE CBC W/AUTO DIFF WBC: CPT | Performed by: INTERNAL MEDICINE

## 2019-01-23 PROCEDURE — 82570 ASSAY OF URINE CREATININE: CPT | Performed by: INTERNAL MEDICINE

## 2019-01-23 PROCEDURE — 36415 COLL VENOUS BLD VENIPUNCTURE: CPT | Performed by: INTERNAL MEDICINE

## 2019-01-23 PROCEDURE — 84443 ASSAY THYROID STIM HORMONE: CPT

## 2019-01-23 PROCEDURE — 83036 HEMOGLOBIN GLYCOSYLATED A1C: CPT

## 2019-01-23 PROCEDURE — 80053 COMPREHEN METABOLIC PANEL: CPT

## 2019-01-23 NOTE — TELEPHONE ENCOUNTER
Patient of Dr Benito King called stating that she needs new lab orders to be put in the system, because the orders she has are  from 2018  Patient is at the [facility] now to have blood work done

## 2019-01-24 DIAGNOSIS — I48.20 CHRONIC ATRIAL FIBRILLATION (HCC): Primary | ICD-10-CM

## 2019-01-24 LAB
EST. AVERAGE GLUCOSE BLD GHB EST-MCNC: 174 MG/DL
HBA1C MFR BLD: 7.7 % (ref 4.2–6.3)

## 2019-01-24 NOTE — TELEPHONE ENCOUNTER
PT NEEDS REFILL ON APIXABAN 5MG (90 DAY SUPPLY) SENT TO [facility] MAIL DELIVERY   Geno Mitchell

## 2019-01-31 ENCOUNTER — OFFICE VISIT (OUTPATIENT)
Dept: NEPHROLOGY | Facility: CLINIC | Age: 74
End: 2019-01-31
Payer: MEDICARE

## 2019-01-31 VITALS
WEIGHT: 257 LBS | RESPIRATION RATE: 16 BRPM | SYSTOLIC BLOOD PRESSURE: 120 MMHG | HEIGHT: 66 IN | BODY MASS INDEX: 41.3 KG/M2 | HEART RATE: 80 BPM | TEMPERATURE: 98.1 F | DIASTOLIC BLOOD PRESSURE: 70 MMHG

## 2019-01-31 DIAGNOSIS — I10 BENIGN ESSENTIAL HYPERTENSION: ICD-10-CM

## 2019-01-31 DIAGNOSIS — E11.8 TYPE 2 DIABETES MELLITUS WITH COMPLICATION, WITHOUT LONG-TERM CURRENT USE OF INSULIN (HCC): ICD-10-CM

## 2019-01-31 DIAGNOSIS — I48.0 PAROXYSMAL A-FIB (HCC): ICD-10-CM

## 2019-01-31 DIAGNOSIS — R80.9 PROTEINURIA, UNSPECIFIED TYPE: Primary | ICD-10-CM

## 2019-01-31 PROCEDURE — 99213 OFFICE O/P EST LOW 20 MIN: CPT | Performed by: INTERNAL MEDICINE

## 2019-01-31 NOTE — ASSESSMENT & PLAN NOTE
It is stable at about 2 ordered mg  I will continue ARB blocker as part of the treatment  Likely etiology is hypertension along with obesity

## 2019-01-31 NOTE — ASSESSMENT & PLAN NOTE
Very well controlled with present medication which include ARB blocker    I will continue same medication as it is helpful to the kidney also

## 2019-01-31 NOTE — LETTER
January 31, 2019     Sada Gimenez DO  [address] [place] [address]    Patient: Julianna Marcum   YOB: 1945   Date of Visit: 1/31/2019       Dear Dr Akbar Lawler: Thank you for referring Julianna Marcum to me for evaluation  Below are my notes for this consultation  If you have questions, please do not hesitate to call me  I look forward to following your patient along with you  Sincerely,        Martin Trammell MD        CC: No Recipients  Martin Trammell MD  1/31/2019 11:19 AM  Sign at close encounter  [address] 72 y o  female MRN: [id number]    Encounter: [id number] 1/31/2019    REASON FOR VISIT: Julianna Marcum is a 72 y o  female who is here on 1/31/2019 for Proteinuria and Hypertension    HPI:    Ines Woodruff came in today for follow-up of proteinuria  She is feeling quite well  Denies any complaint  No abdominal pain no nausea no vomiting no urinary complaint        REVIEW OF SYSTEMS:    Review of Systems   Constitutional: Negative for activity change and fatigue  HENT: Negative for congestion and ear discharge  Eyes: Negative for photophobia and pain  Respiratory: Negative for apnea and choking  Cardiovascular: Negative for chest pain and palpitations  Gastrointestinal: Negative for abdominal distention and blood in stool  Endocrine: Negative for heat intolerance and polyphagia  Genitourinary: Negative for flank pain and urgency  Musculoskeletal: Negative for neck pain and neck stiffness  Skin: Negative for color change and wound  Allergic/Immunologic: Negative for food allergies and immunocompromised state  Neurological: Negative for seizures and facial asymmetry  Hematological: Negative for adenopathy  Does not bruise/bleed easily  Psychiatric/Behavioral: Negative for self-injury and suicidal ideas           PAST MEDICAL HISTORY:  Past Medical History:   Diagnosis Date    [facility] fibrillation ([facility] )     Diabetes mellitus ([facility] )     Elevation of level of transaminase and lactic acid dehydrogenase (LDH)     Hypercholesterolemia     Last assessed: 6/11/14    Hypercholesterolemia     Malignant neoplasm of female breast ([facility] )     Pulmonary edema     Septicemia (HCC)     Splenic disorder     Systemic inflammatory response syndrome (SIRS) due to infectious process ([place] [address] )        PAST SURGICAL HISTORY:  Past Surgical History:   Procedure Laterality Date    BREAST RECONSTRUCTION Bilateral     w/implant prosthesis    MASTECTOMY      REPLACEMENT TOTAL KNEE Right     SPLENECTOMY      TONSILLECTOMY         SOCIAL HISTORY:  History   Alcohol Use No     History   Drug Use No     History   Smoking Status    Never Smoker   Smokeless Tobacco    Never Used       FAMILY HISTORY:  Family History   Problem Relation Age of Onset    Diabetes Mother         Mellitus    Kidney disease Mother     Breast cancer Family        MEDICATIONS:    Current Outpatient Prescriptions:     apixaban (ELIQUIS) 5 mg, Take 1 tablet (5 mg total) by mouth 2 (two) times a day, Disp: 90 tablet, Rfl: 3    brimonidine-timolol (COMBIGAN) 0 2-0 5 %, Apply 1 drop to eye daily, Disp: , Rfl:     citalopram (CeleXA) 20 mg tablet, Take 1 tablet daily, Disp: 90 tablet, Rfl: 1    DILT- MG 24 hr capsule, TAKE 1 CAPSULE EVERY DAY, Disp: 90 capsule, Rfl: 3    glucose blood (ACCU-CHEK JAMARCUS PLUS) test strip, by In Vitro route, Disp: , Rfl:     irbesartan (AVAPRO) 150 mg tablet, Take 1 tablet (150 mg total) by mouth daily, Disp: 30 tablet, Rfl: 0    metFORMIN (GLUCOPHAGE) 500 mg tablet, TAKE 1 TABLET EVERY MORNING AND TAKE 2 TABLETS IN THE EVENING, Disp: 270 tablet, Rfl: 4    metoprolol tartrate (LOPRESSOR) 25 mg tablet, Take 1 tablet (25 mg total) by mouth daily, Disp: 90 tablet, Rfl: 3    RESTASIS MULTIDOSE 0 05 % ophthalmic emulsion, , Disp: , Rfl:     simvastatin (ZOCOR) 20 mg tablet, Take 1 tablet (20 mg total) by mouth daily, Disp: 90 tablet, Rfl: 3    PHYSICAL EXAM:  Vitals:    01/31/19 0916   BP: 120/70   BP Location: Right arm   Patient Position: Sitting   Pulse: 80   Resp: 16   Temp: 98 1 °F (36 7 °C)   Weight: 117 kg (257 lb)   Height: 5' 6" (1 676 m)     Body mass index is 41 48 kg/m²  Physical Exam   Constitutional: She is oriented to person, place, and time  She appears well-developed  No distress  HENT:   Head: Normocephalic and atraumatic  Mouth/Throat: Oropharynx is clear and moist    Eyes: Pupils are equal, round, and reactive to light  Conjunctivae are normal  No scleral icterus  Neck: Normal range of motion  Neck supple  No JVD present  Cardiovascular: Normal rate and normal heart sounds  No murmur heard  Pulmonary/Chest: Effort normal and breath sounds normal  She has no wheezes  Abdominal: Soft  Bowel sounds are normal  She exhibits no distension and no mass  There is no tenderness  Musculoskeletal: Normal range of motion  She exhibits no edema  Neurological: She is alert and oriented to person, place, and time  Skin: Skin is warm  No rash noted  Psychiatric: She has a normal mood and affect   Her behavior is normal        LAB RESULTS:  Results for orders placed or performed in visit on 01/23/19   Comprehensive metabolic panel   Result Value Ref Range    Sodium 139 136 - 145 mmol/L    Potassium 4 7 3 5 - 5 3 mmol/L    Chloride 102 100 - 108 mmol/L    CO2 27 21 - 32 mmol/L    ANION GAP 10 4 - 13 mmol/L    BUN 16 5 - 25 mg/dL    Creatinine 0 97 0 60 - 1 30 mg/dL    Glucose, Fasting 131 (H) 65 - 99 mg/dL    Calcium 10 0 8 3 - 10 1 mg/dL    AST 19 5 - 45 U/L    ALT 17 12 - 78 U/L    Alkaline Phosphatase 153 (H) 46 - 116 U/L    Total Protein 8 5 (H) 6 4 - 8 2 g/dL    Albumin 3 6 3 5 - 5 0 g/dL    Total Bilirubin 0 80 0 20 - 1 00 mg/dL    eGFR 59 ml/min/1 73sq m   HEMOGLOBIN A1C W/ EAG ESTIMATION   Result Value Ref Range    Hemoglobin A1C 7 7 (H) 4 2 - 6 3 %     mg/dl   Lipid panel   Result Value Ref Range Cholesterol 177 50 - 200 mg/dL    Triglycerides 133 <=150 mg/dL    HDL, Direct 68 (H) 40 - 60 mg/dL    LDL Calculated 82 0 - 100 mg/dL    Non-HDL-Chol (CHOL-HDL) 109 mg/dl   TSH, 3rd generation with Free T4 reflex   Result Value Ref Range    TSH 3RD GENERATON 1 496 0 358 - 3 740 uIU/mL       ASSESSMENT and PLAN:      Benign essential hypertension  Very well controlled with present medication which include ARB blocker  I will continue same medication as it is helpful to the kidney also    Proteinuria  It is stable at about 2 ordered mg  I will continue ARB blocker as part of the treatment  Likely etiology is hypertension along with obesity  I will see her back in 1 year again        Portions of the record may have been created with voice recognition software  Occasional wrong word or "sound a like" substitutions may have occurred due to the inherent limitations of voice recognition software  Read the chart carefully and recognize, using context, where substitutions have occurred  If you have any questions, please contact the dictating provider

## 2019-01-31 NOTE — PROGRESS NOTES
NEPHROLOGY OFFICE FOLLOW UP  Esther Bergeron 72 y o  female MRN: [id number]    Encounter: [id number] 1/31/2019    REASON FOR VISIT: Esther Bergeron is a 72 y o  female who is here on 1/31/2019 for Proteinuria and Hypertension    HPI:    Ronda Church came in today for follow-up of proteinuria  She is feeling quite well  Denies any complaint  No abdominal pain no nausea no vomiting no urinary complaint        REVIEW OF SYSTEMS:    Review of Systems   Constitutional: Negative for activity change and fatigue  HENT: Negative for congestion and ear discharge  Eyes: Negative for photophobia and pain  Respiratory: Negative for apnea and choking  Cardiovascular: Negative for chest pain and palpitations  Gastrointestinal: Negative for abdominal distention and blood in stool  Endocrine: Negative for heat intolerance and polyphagia  Genitourinary: Negative for flank pain and urgency  Musculoskeletal: Negative for neck pain and neck stiffness  Skin: Negative for color change and wound  Allergic/Immunologic: Negative for food allergies and immunocompromised state  Neurological: Negative for seizures and facial asymmetry  Hematological: Negative for adenopathy  Does not bruise/bleed easily  Psychiatric/Behavioral: Negative for self-injury and suicidal ideas           PAST MEDICAL HISTORY:  Past Medical History:   Diagnosis Date    Atrial fibrillation ([facility] )     Diabetes mellitus ([facility] )     Elevation of level of transaminase and lactic acid dehydrogenase (LDH)     Hypercholesterolemia     Last assessed: 6/11/14    Hypercholesterolemia     [facility] )     Pulmonary edema     Septicemia (HCC)     Splenic disorder     Systemic inflammatory response syndrome (SIRS) due to infectious process ([facility] )        PAST SURGICAL HISTORY:  Past Surgical History:   Procedure Laterality Date    BREAST RECONSTRUCTION Bilateral     w/implant prosthesis    MASTECTOMY      REPLACEMENT TOTAL KNEE Right     SPLENECTOMY      TONSILLECTOMY         SOCIAL HISTORY:  History   Alcohol Use No     History   Drug Use No     History   Smoking Status    Never Smoker   Smokeless Tobacco    Never Used       FAMILY HISTORY:  Family History   Problem Relation Age of Onset    Diabetes Mother         Mellitus    Kidney disease Mother     Breast cancer Family        MEDICATIONS:    Current Outpatient Prescriptions:     apixaban (ELIQUIS) 5 mg, Take 1 tablet (5 mg total) by mouth 2 (two) times a day, Disp: 90 tablet, Rfl: 3    brimonidine-timolol (COMBIGAN) 0 2-0 5 %, Apply 1 drop to eye daily, Disp: , Rfl:     citalopram (CeleXA) 20 mg tablet, Take 1 tablet daily, Disp: 90 tablet, Rfl: 1    DILT- MG 24 hr capsule, TAKE 1 CAPSULE EVERY DAY, Disp: 90 capsule, Rfl: 3    glucose blood (ACCU-CHEK JAMARCUS PLUS) test strip, by In Vitro route, Disp: , Rfl:     irbesartan (AVAPRO) 150 mg tablet, Take 1 tablet (150 mg total) by mouth daily, Disp: 30 tablet, Rfl: 0    metFORMIN (GLUCOPHAGE) 500 mg tablet, TAKE 1 TABLET EVERY MORNING AND TAKE 2 TABLETS IN THE EVENING, Disp: 270 tablet, Rfl: 4    metoprolol tartrate (LOPRESSOR) 25 mg tablet, Take 1 tablet (25 mg total) by mouth daily, Disp: 90 tablet, Rfl: 3    RESTASIS MULTIDOSE 0 05 % ophthalmic emulsion, , Disp: , Rfl:     simvastatin (ZOCOR) 20 mg tablet, Take 1 tablet (20 mg total) by mouth daily, Disp: 90 tablet, Rfl: 3    PHYSICAL EXAM:  Vitals:    01/31/19 0916   BP: 120/70   BP Location: Right arm   Patient Position: Sitting   Pulse: 80   Resp: 16   Temp: 98 1 °F (36 7 °C)   Weight: 117 kg (257 lb)   Height: 5' 6" (1 676 m)     Body mass index is 41 48 kg/m²  Physical Exam   Constitutional: She is oriented to person, place, and time  She appears well-developed  No distress  HENT:   Head: Normocephalic and atraumatic  Mouth/Throat: Oropharynx is clear and moist    Eyes: Pupils are equal, round, and reactive to light   Conjunctivae are normal  No scleral icterus  Neck: Normal range of motion  Neck supple  No JVD present  Cardiovascular: Normal rate and normal heart sounds  No murmur heard  Pulmonary/Chest: Effort normal and breath sounds normal  She has no wheezes  Abdominal: Soft  Bowel sounds are normal  She exhibits no distension and no mass  There is no tenderness  Musculoskeletal: Normal range of motion  She exhibits no edema  Neurological: She is alert and oriented to person, place, and time  Skin: Skin is warm  No rash noted  Psychiatric: She has a normal mood and affect  Her behavior is normal        LAB RESULTS:  Results for orders placed or performed in visit on 01/23/19   Comprehensive metabolic panel   Result Value Ref Range    Sodium 139 136 - 145 mmol/L    Potassium 4 7 3 5 - 5 3 mmol/L    Chloride 102 100 - 108 mmol/L    CO2 27 21 - 32 mmol/L    ANION GAP 10 4 - 13 mmol/L    BUN 16 5 - 25 mg/dL    Creatinine 0 97 0 60 - 1 30 mg/dL    Glucose, Fasting 131 (H) 65 - 99 mg/dL    Calcium 10 0 8 3 - 10 1 mg/dL    AST 19 5 - 45 U/L    ALT 17 12 - 78 U/L    Alkaline Phosphatase 153 (H) 46 - 116 U/L    Total Protein 8 5 (H) 6 4 - 8 2 g/dL    Albumin 3 6 3 5 - 5 0 g/dL    Total Bilirubin 0 80 0 20 - 1 00 mg/dL    eGFR 59 ml/min/1 73sq m   HEMOGLOBIN A1C W/ EAG ESTIMATION   Result Value Ref Range    Hemoglobin A1C 7 7 (H) 4 2 - 6 3 %     mg/dl   Lipid panel   Result Value Ref Range    Cholesterol 177 50 - 200 mg/dL    Triglycerides 133 <=150 mg/dL    HDL, Direct 68 (H) 40 - 60 mg/dL    LDL Calculated 82 0 - 100 mg/dL    Non-HDL-Chol (CHOL-HDL) 109 mg/dl   TSH, 3rd generation with Free T4 reflex   Result Value Ref Range    TSH 3RD GENERATON 1 496 0 358 - 3 740 uIU/mL       ASSESSMENT and PLAN:      Benign essential hypertension  Very well controlled with present medication which include ARB blocker  I will continue same medication as it is helpful to the kidney also    Proteinuria  It is stable at about 2 ordered mg    I will continue ARB blocker as part of the treatment  Likely etiology is hypertension along with obesity  I will see her back in 1 year again        Portions of the record may have been created with voice recognition software  Occasional wrong word or "sound a like" substitutions may have occurred due to the inherent limitations of voice recognition software  Read the chart carefully and recognize, using context, where substitutions have occurred  If you have any questions, please contact the dictating provider

## 2019-03-04 DIAGNOSIS — I10 ESSENTIAL HYPERTENSION: ICD-10-CM

## 2019-03-04 RX ORDER — DILTIAZEM HYDROCHLORIDE 240 MG/1
CAPSULE, EXTENDED RELEASE ORAL
Qty: 90 CAPSULE | Refills: 3 | Status: SHIPPED | OUTPATIENT
Start: 2019-03-04 | End: 2020-03-17

## 2019-03-18 DIAGNOSIS — I10 HYPERTENSION, ESSENTIAL: ICD-10-CM

## 2019-04-15 ENCOUNTER — OFFICE VISIT (OUTPATIENT)
Dept: CARDIOLOGY CLINIC | Facility: CLINIC | Age: 74
End: 2019-04-15
Payer: MEDICARE

## 2019-04-15 VITALS
HEART RATE: 68 BPM | BODY MASS INDEX: 41.14 KG/M2 | DIASTOLIC BLOOD PRESSURE: 82 MMHG | OXYGEN SATURATION: 96 % | SYSTOLIC BLOOD PRESSURE: 132 MMHG | HEIGHT: 66 IN | WEIGHT: 256 LBS

## 2019-04-15 DIAGNOSIS — I48.0 PAROXYSMAL A-FIB (HCC): Primary | ICD-10-CM

## 2019-04-15 DIAGNOSIS — E78.2 MIXED HYPERLIPIDEMIA: ICD-10-CM

## 2019-04-15 DIAGNOSIS — I10 BENIGN ESSENTIAL HYPERTENSION: ICD-10-CM

## 2019-04-15 DIAGNOSIS — Z79.01 CHRONIC ANTICOAGULATION: ICD-10-CM

## 2019-04-15 PROCEDURE — 99214 OFFICE O/P EST MOD 30 MIN: CPT | Performed by: INTERNAL MEDICINE

## 2019-05-23 DIAGNOSIS — E78.2 COMBINED HYPERLIPIDEMIA: ICD-10-CM

## 2019-05-23 DIAGNOSIS — I10 ESSENTIAL HYPERTENSION: ICD-10-CM

## 2019-05-23 RX ORDER — SIMVASTATIN 20 MG
TABLET ORAL
Qty: 90 TABLET | Refills: 3 | Status: SHIPPED | OUTPATIENT
Start: 2019-05-23 | End: 2020-04-28

## 2019-05-23 RX ORDER — IRBESARTAN 150 MG/1
TABLET ORAL
Qty: 90 TABLET | Refills: 3 | Status: SHIPPED | OUTPATIENT
Start: 2019-05-23 | End: 2020-04-21

## 2019-06-03 DIAGNOSIS — F32.A ANXIETY AND DEPRESSION: ICD-10-CM

## 2019-06-03 DIAGNOSIS — F41.9 ANXIETY AND DEPRESSION: ICD-10-CM

## 2019-06-03 RX ORDER — CITALOPRAM 20 MG/1
TABLET ORAL
Qty: 90 TABLET | Refills: 1 | Status: SHIPPED | OUTPATIENT
Start: 2019-06-03 | End: 2019-10-15 | Stop reason: SDUPTHER

## 2019-06-17 DIAGNOSIS — E11.8 TYPE 2 DIABETES MELLITUS WITH COMPLICATION, WITHOUT LONG-TERM CURRENT USE OF INSULIN (HCC): Primary | ICD-10-CM

## 2019-06-17 DIAGNOSIS — I48.20 CHRONIC ATRIAL FIBRILLATION (HCC): ICD-10-CM

## 2019-06-17 RX ORDER — APIXABAN 5 MG/1
TABLET, FILM COATED ORAL
Qty: 180 TABLET | Refills: 3 | Status: SHIPPED | OUTPATIENT
Start: 2019-06-17 | End: 2020-07-20

## 2019-06-18 RX ORDER — LANCETS
EACH MISCELLANEOUS
Qty: 100 EACH | Refills: 3 | Status: SHIPPED | OUTPATIENT
Start: 2019-06-18 | End: 2021-04-19 | Stop reason: SDUPTHER

## 2019-06-18 RX ORDER — BLOOD-GLUCOSE METER
EACH MISCELLANEOUS
Qty: 1 EACH | Refills: 0 | Status: SHIPPED | OUTPATIENT
Start: 2019-06-18

## 2019-07-30 ENCOUNTER — TELEPHONE (OUTPATIENT)
Dept: INTERNAL MEDICINE CLINIC | Facility: CLINIC | Age: 74
End: 2019-07-30

## 2019-08-21 DIAGNOSIS — E11.9 TYPE 2 DIABETES MELLITUS WITHOUT COMPLICATION, WITHOUT LONG-TERM CURRENT USE OF INSULIN (HCC): ICD-10-CM

## 2019-09-06 ENCOUNTER — TELEPHONE (OUTPATIENT)
Dept: INTERNAL MEDICINE CLINIC | Facility: CLINIC | Age: 74
End: 2019-09-06

## 2019-09-30 ENCOUNTER — TELEPHONE (OUTPATIENT)
Dept: INTERNAL MEDICINE CLINIC | Facility: CLINIC | Age: 74
End: 2019-09-30

## 2019-09-30 NOTE — TELEPHONE ENCOUNTER
Spoke with patient, she stated that she had a double mastectomy and is not due for a mammogram     Patient needs to be marked "not a candidate for breast cancer screening" in health maintenance, please advise

## 2019-10-14 ENCOUNTER — OFFICE VISIT (OUTPATIENT)
Dept: INTERNAL MEDICINE CLINIC | Facility: CLINIC | Age: 74
End: 2019-10-14
Payer: MEDICARE

## 2019-10-14 VITALS — OXYGEN SATURATION: 95 % | HEART RATE: 76 BPM | SYSTOLIC BLOOD PRESSURE: 100 MMHG | DIASTOLIC BLOOD PRESSURE: 62 MMHG

## 2019-10-14 DIAGNOSIS — I48.0 PAROXYSMAL A-FIB (HCC): ICD-10-CM

## 2019-10-14 DIAGNOSIS — Z79.01 CHRONIC ANTICOAGULATION: ICD-10-CM

## 2019-10-14 DIAGNOSIS — R80.9 PROTEINURIA, UNSPECIFIED TYPE: ICD-10-CM

## 2019-10-14 DIAGNOSIS — C50.911 MALIGNANT NEOPLASM OF RIGHT FEMALE BREAST, UNSPECIFIED ESTROGEN RECEPTOR STATUS, UNSPECIFIED SITE OF BREAST (HCC): ICD-10-CM

## 2019-10-14 DIAGNOSIS — E78.2 MIXED HYPERLIPIDEMIA: ICD-10-CM

## 2019-10-14 DIAGNOSIS — E66.9 OBESITY, UNSPECIFIED CLASSIFICATION, UNSPECIFIED OBESITY TYPE, UNSPECIFIED WHETHER SERIOUS COMORBIDITY PRESENT: ICD-10-CM

## 2019-10-14 DIAGNOSIS — F32.A DEPRESSION, UNSPECIFIED DEPRESSION TYPE: ICD-10-CM

## 2019-10-14 DIAGNOSIS — I10 BENIGN ESSENTIAL HYPERTENSION: Primary | ICD-10-CM

## 2019-10-14 DIAGNOSIS — E11.9 TYPE 2 DIABETES MELLITUS WITHOUT COMPLICATION, WITHOUT LONG-TERM CURRENT USE OF INSULIN (HCC): ICD-10-CM

## 2019-10-14 DIAGNOSIS — M81.0 POSTMENOPAUSAL BONE LOSS: ICD-10-CM

## 2019-10-14 PROCEDURE — 99214 OFFICE O/P EST MOD 30 MIN: CPT | Performed by: INTERNAL MEDICINE

## 2019-10-15 DIAGNOSIS — F41.9 ANXIETY AND DEPRESSION: ICD-10-CM

## 2019-10-15 DIAGNOSIS — F32.A ANXIETY AND DEPRESSION: ICD-10-CM

## 2019-10-15 RX ORDER — CITALOPRAM 20 MG/1
TABLET ORAL
Qty: 90 TABLET | Refills: 1 | Status: SHIPPED | OUTPATIENT
Start: 2019-10-15 | End: 2020-03-10

## 2019-10-15 NOTE — PROGRESS NOTES
Assessment/Plan:  Regarding her blood pressure is stable at the present time  She needs to tack all of her blood work  She needs to check her blood sugars  She needs to come back in 6 weeks with a list of blood sugars  Will recheck her blood pressure at that time  She needs to lose weight  The importance of glycemic control and follow-up was stressed to the patient  She claims that she understands that now  1  Benign essential hypertension  CBC and differential    Comprehensive metabolic panel    Lipid panel   2  Paroxysmal A-fib ([facility] )     3  Depression, unspecified depression type  HEMOGLOBIN A1C W/ EAG ESTIMATION   4  Mixed hyperlipidemia     5  Obesity, unspecified classification, unspecified obesity type, unspecified whether serious comorbidity present  Microalbumin / creatinine urine ratio    TSH, 3rd generation with Free T4 reflex    UA w Reflex to Microscopic w Reflex to Culture - Clinic Collect   6  Proteinuria, unspecified type     7  Chronic anticoagulation     8  Malignant neoplasm of right female breast, unspecified estrogen receptor status, unspecified site of breast ([facility] )     9  Postmenopausal bone loss  DXA bone density spine hip and pelvis   10  Type 2 diabetes mellitus without complication, without long-term current use of insulin ([place] [address] )         Orders Placed This Encounter   Procedures    DXA bone density spine hip and pelvis    CBC and differential    Comprehensive metabolic panel    HEMOGLOBIN A1C W/ EAG ESTIMATION    Lipid panel    Microalbumin / creatinine urine ratio    TSH, 3rd generation with Free T4 reflex    UA w Reflex to Microscopic w Reflex to Culture - Clinic Collect         Subjective:  She comes in for follow-up  I have not seen her in more than a year  The importance of follow-up on a regular basis was stressed to the patient  She has multiple problems including hypertension diabetes and history of breast cancer  She is obese    She has hyperlipidemia  She has a history of depression  Patient ID: Rene Zhang is a 69 y o  female  HPI    The following portions of the patient's history were reviewed and updated as appropriate:   She has a past medical history of Atrial fibrillation ([facility] ), Diabetes mellitus ([facility] ), Elevation of level of transaminase and lactic acid dehydrogenase (LDH), Hypercholesterolemia, Hypercholesterolemia, Malignant neoplasm of female breast ([facility] ), Pulmonary edema, Septicemia ([facility] ), Splenic disorder, and Systemic inflammatory response syndrome (SIRS) due to infectious process  ,  does not have any pertinent problems on file  ,   has a past surgical history that includes Breast reconstruction (Bilateral); Mastectomy; Replacement total knee (Right); Splenectomy; and Tonsillectomy  ,  family history includes Breast cancer in her family; Diabetes in her mother; Kidney disease in her mother  ,   reports that she has never smoked  She has never used smokeless tobacco  She reports that she does not drink alcohol or use drugs  ,  has No Known Allergies       Current Outpatient Medications:     Blood Glucose Monitoring Suppl (ONE TOUCH ULTRA 2) w/Device KIT, Patient to test once daily, Disp: 1 each, Rfl: 0    brimonidine-timolol (COMBIGAN) 0 2-0 5 %, Apply 1 drop to eye daily, Disp: , Rfl:     citalopram (CeleXA) 20 mg tablet, TAKE 1 TABLET EVERY DAY, Disp: 90 tablet, Rfl: 1    DILT- MG 24 hr capsule, TAKE 1 CAPSULE EVERY DAY, Disp: 90 capsule, Rfl: 3    ELIQUIS 5 MG, TAKE 1 TABLET TWICE DAILY, Disp: 180 tablet, Rfl: 3    glucose blood (ACCU-CHEK JAMARCUS PLUS) test strip, by In Vitro route, Disp: , Rfl:     glucose blood (ONE TOUCH ULTRA TEST) test strip, Patient to test once daily, Disp: 100 each, Rfl: 3    irbesartan (AVAPRO) 150 mg tablet, TAKE 1 TABLET EVERY DAY, Disp: 90 tablet, Rfl: 3    Lancets (ONETOUCH ULTRASOFT) lancets, Patient to test once daily, Disp: 100 each, Rfl: 3    metFORMIN (GLUCOPHAGE) 500 mg tablet, TAKE 1 TABLET EVERY MORNING AND TAKE 2 TABLETS IN THE EVENING, Disp: 270 tablet, Rfl: 4    metoprolol tartrate (LOPRESSOR) 25 mg tablet, TAKE 1 TABLET EVERY DAY, Disp: 90 tablet, Rfl: 3    RESTASIS MULTIDOSE 0 05 % ophthalmic emulsion, , Disp: , Rfl:     simvastatin (ZOCOR) 20 mg tablet, TAKE 1 TABLET EVERY DAY, Disp: 90 tablet, Rfl: 3    Review of Systems   Constitutional: Positive for fatigue  Negative for activity change, appetite change, chills, diaphoresis, fever and unexpected weight change  She remains morbidly obese  HENT: Negative for congestion, ear pain, hearing loss, mouth sores, nosebleeds, postnasal drip, sinus pressure, sinus pain, sore throat and trouble swallowing  Eyes: Negative for pain, discharge and visual disturbance  Respiratory: Positive for shortness of breath  Negative for apnea, cough, chest tightness and wheezing  Cardiovascular: Negative for chest pain, palpitations and leg swelling  Gastrointestinal: Negative for abdominal pain, anal bleeding, blood in stool, constipation, diarrhea, nausea and vomiting  She is morbidly obese  Endocrine: Negative for polydipsia and polyphagia  Genitourinary: Negative for decreased urine volume, dysuria, flank pain, frequency, hematuria and urgency  Musculoskeletal: Negative for arthralgias, back pain, gait problem, joint swelling and myalgias  Skin: Negative for rash and wound  Allergic/Immunologic: Negative for environmental allergies and food allergies  Neurological: Negative for dizziness, tremors, seizures, syncope, speech difficulty, light-headedness, numbness and headaches  Hematological: Negative for adenopathy  Does not bruise/bleed easily  She had bilateral mastectomies  Psychiatric/Behavioral: Negative for agitation, confusion, hallucinations, sleep disturbance and suicidal ideas  The patient is not nervous/anxious            Objective:  /62 (BP Location: Left arm, Patient Position: Sitting, Cuff Size: Large)   Pulse 76   SpO2 95%      Physical Exam   Constitutional: She appears well-developed  No distress  Overweight female in no acute distress  Blood pressure is 138/70  HENT:   Head: Normocephalic  Right Ear: External ear normal    Left Ear: External ear normal    Nose: Nose normal    Mouth/Throat: Oropharynx is clear and moist  No oropharyngeal exudate  Eyes: Pupils are equal, round, and reactive to light  Conjunctivae and EOM are normal  Right eye exhibits no discharge  Left eye exhibits no discharge  Neck: Normal range of motion  Neck supple  No thyromegaly present  Cardiovascular: Normal rate, regular rhythm, normal heart sounds and intact distal pulses  Exam reveals no gallop and no friction rub  Pulses are no weak pulses  No murmur heard  Pulses:       Dorsalis pedis pulses are 2+ on the right side, and 2+ on the left side  Posterior tibial pulses are 2+ on the right side, and 2+ on the left side  Pulmonary/Chest: Effort normal and breath sounds normal  No respiratory distress  She has no wheezes  She has no rales  Abdominal: Soft  Bowel sounds are normal  She exhibits no distension and no mass  There is no tenderness  There is no rebound and no guarding  Abdomen is morbidly obese soft nontender with no masses  Musculoskeletal: Normal range of motion  She exhibits no edema, tenderness or deformity  Feet:   Right Foot:   Skin Integrity: Positive for dry skin  Negative for ulcer, skin breakdown, erythema, warmth or callus  Left Foot:   Skin Integrity: Positive for dry skin  Negative for ulcer, skin breakdown, erythema, warmth or callus  Lymphadenopathy:     She has no cervical adenopathy  Neurological: She is alert  She has normal reflexes  She displays normal reflexes  No cranial nerve deficit  Coordination normal    Skin: Skin is warm and dry  No rash noted  No erythema  Psychiatric: She has a normal mood and affect   Her behavior is normal  Judgment and thought content normal    Nursing note and vitals reviewed  Patient's shoes and socks removed  Right Foot/Ankle   Right Foot Inspection  Skin Exam: skin normal, skin intact and dry skin no warmth, no callus, no erythema, no maceration, no abnormal color, no pre-ulcer, no ulcer and no callus                            Sensory     Proprioception: intact   Monofilament testing: intact  Vascular    The right DP pulse is 2+  The right PT pulse is 2+  Left Foot/Ankle  Left Foot Inspection  Skin Exam: skin normal, skin intact and dry skinno warmth, no erythema, no maceration, normal color, no pre-ulcer, no ulcer and no callus                                         Sensory     Proprioception: intact  Monofilament: intact  Vascular    The left DP pulse is 2+  The left PT pulse is 2+  Assign Risk Category:  No deformity present; No loss of protective sensation; No weak pulses       Risk: 0      No results found for this or any previous visit (from the past 1008 hour(s))

## 2019-11-19 ENCOUNTER — APPOINTMENT (OUTPATIENT)
Dept: LAB | Facility: HOSPITAL | Age: 74
End: 2019-11-19
Attending: INTERNAL MEDICINE
Payer: MEDICARE

## 2019-11-19 DIAGNOSIS — F32.A DEPRESSION, UNSPECIFIED DEPRESSION TYPE: ICD-10-CM

## 2019-11-19 DIAGNOSIS — I10 BENIGN ESSENTIAL HYPERTENSION: ICD-10-CM

## 2019-11-19 DIAGNOSIS — E66.9 OBESITY, UNSPECIFIED CLASSIFICATION, UNSPECIFIED OBESITY TYPE, UNSPECIFIED WHETHER SERIOUS COMORBIDITY PRESENT: ICD-10-CM

## 2019-11-19 LAB
ALBUMIN SERPL BCP-MCNC: 3.5 G/DL (ref 3.5–5)
ALP SERPL-CCNC: 135 U/L (ref 46–116)
ALT SERPL W P-5'-P-CCNC: 19 U/L (ref 12–78)
ANION GAP SERPL CALCULATED.3IONS-SCNC: 9 MMOL/L (ref 4–13)
AST SERPL W P-5'-P-CCNC: 16 U/L (ref 5–45)
BACTERIA UR QL AUTO: ABNORMAL /HPF
BASOPHILS # BLD AUTO: 0.07 THOUSANDS/ΜL (ref 0–0.1)
BASOPHILS NFR BLD AUTO: 1 % (ref 0–1)
BILIRUB SERPL-MCNC: 0.7 MG/DL (ref 0.2–1)
BILIRUB UR QL STRIP: NEGATIVE
BUN SERPL-MCNC: 15 MG/DL (ref 5–25)
CALCIUM SERPL-MCNC: 9.6 MG/DL (ref 8.3–10.1)
CHLORIDE SERPL-SCNC: 102 MMOL/L (ref 100–108)
CHOLEST SERPL-MCNC: 179 MG/DL (ref 50–200)
CLARITY UR: CLEAR
CO2 SERPL-SCNC: 28 MMOL/L (ref 21–32)
COLOR UR: YELLOW
CREAT SERPL-MCNC: 0.93 MG/DL (ref 0.6–1.3)
CREAT UR-MCNC: 63.3 MG/DL
EOSINOPHIL # BLD AUTO: 0.34 THOUSAND/ΜL (ref 0–0.61)
EOSINOPHIL NFR BLD AUTO: 4 % (ref 0–6)
ERYTHROCYTE [DISTWIDTH] IN BLOOD BY AUTOMATED COUNT: 12.9 % (ref 11.6–15.1)
EST. AVERAGE GLUCOSE BLD GHB EST-MCNC: 148 MG/DL
GFR SERPL CREATININE-BSD FRML MDRD: 62 ML/MIN/1.73SQ M
GLUCOSE P FAST SERPL-MCNC: 125 MG/DL (ref 65–99)
GLUCOSE UR STRIP-MCNC: NEGATIVE MG/DL
HBA1C MFR BLD: 6.8 % (ref 4.2–6.3)
HCT VFR BLD AUTO: 43.8 % (ref 34.8–46.1)
HDLC SERPL-MCNC: 65 MG/DL
HGB BLD-MCNC: 14.2 G/DL (ref 11.5–15.4)
HGB UR QL STRIP.AUTO: ABNORMAL
IMM GRANULOCYTES # BLD AUTO: 0.04 THOUSAND/UL (ref 0–0.2)
IMM GRANULOCYTES NFR BLD AUTO: 1 % (ref 0–2)
KETONES UR STRIP-MCNC: NEGATIVE MG/DL
LDLC SERPL CALC-MCNC: 94 MG/DL (ref 0–100)
LEUKOCYTE ESTERASE UR QL STRIP: NEGATIVE
LYMPHOCYTES # BLD AUTO: 1.64 THOUSANDS/ΜL (ref 0.6–4.47)
LYMPHOCYTES NFR BLD AUTO: 21 % (ref 14–44)
MCH RBC QN AUTO: 31.1 PG (ref 26.8–34.3)
MCHC RBC AUTO-ENTMCNC: 32.4 G/DL (ref 31.4–37.4)
MCV RBC AUTO: 96 FL (ref 82–98)
MICROALBUMIN UR-MCNC: 165 MG/L (ref 0–20)
MICROALBUMIN/CREAT 24H UR: 261 MG/G CREATININE (ref 0–30)
MONOCYTES # BLD AUTO: 0.74 THOUSAND/ΜL (ref 0.17–1.22)
MONOCYTES NFR BLD AUTO: 9 % (ref 4–12)
MUCOUS THREADS UR QL AUTO: ABNORMAL
NEUTROPHILS # BLD AUTO: 5.15 THOUSANDS/ΜL (ref 1.85–7.62)
NEUTS SEG NFR BLD AUTO: 64 % (ref 43–75)
NITRITE UR QL STRIP: NEGATIVE
NON-SQ EPI CELLS URNS QL MICRO: ABNORMAL /HPF
NONHDLC SERPL-MCNC: 114 MG/DL
NRBC BLD AUTO-RTO: 0 /100 WBCS
PH UR STRIP.AUTO: 5.5 [PH]
PLATELET # BLD AUTO: 317 THOUSANDS/UL (ref 149–390)
PMV BLD AUTO: 11.3 FL (ref 8.9–12.7)
POTASSIUM SERPL-SCNC: 4.6 MMOL/L (ref 3.5–5.3)
PROT SERPL-MCNC: 7.9 G/DL (ref 6.4–8.2)
PROT UR STRIP-MCNC: ABNORMAL MG/DL
RBC # BLD AUTO: 4.57 MILLION/UL (ref 3.81–5.12)
RBC #/AREA URNS AUTO: ABNORMAL /HPF
SODIUM SERPL-SCNC: 139 MMOL/L (ref 136–145)
SP GR UR STRIP.AUTO: 1.01 (ref 1–1.03)
TRIGL SERPL-MCNC: 100 MG/DL
TSH SERPL DL<=0.05 MIU/L-ACNC: 1.34 UIU/ML (ref 0.36–3.74)
UROBILINOGEN UR QL STRIP.AUTO: 0.2 E.U./DL
WBC # BLD AUTO: 7.98 THOUSAND/UL (ref 4.31–10.16)
WBC #/AREA URNS AUTO: ABNORMAL /HPF

## 2019-11-19 PROCEDURE — 85025 COMPLETE CBC W/AUTO DIFF WBC: CPT

## 2019-11-19 PROCEDURE — 80061 LIPID PANEL: CPT

## 2019-11-19 PROCEDURE — 82043 UR ALBUMIN QUANTITATIVE: CPT | Performed by: INTERNAL MEDICINE

## 2019-11-19 PROCEDURE — 82570 ASSAY OF URINE CREATININE: CPT | Performed by: INTERNAL MEDICINE

## 2019-11-19 PROCEDURE — 84443 ASSAY THYROID STIM HORMONE: CPT

## 2019-11-19 PROCEDURE — 36415 COLL VENOUS BLD VENIPUNCTURE: CPT

## 2019-11-19 PROCEDURE — 80053 COMPREHEN METABOLIC PANEL: CPT

## 2019-11-19 PROCEDURE — 81001 URINALYSIS AUTO W/SCOPE: CPT | Performed by: INTERNAL MEDICINE

## 2019-11-19 PROCEDURE — 83036 HEMOGLOBIN GLYCOSYLATED A1C: CPT

## 2019-11-25 ENCOUNTER — OFFICE VISIT (OUTPATIENT)
Dept: INTERNAL MEDICINE CLINIC | Facility: CLINIC | Age: 74
End: 2019-11-25
Payer: MEDICARE

## 2019-11-25 ENCOUNTER — APPOINTMENT (OUTPATIENT)
Dept: LAB | Facility: CLINIC | Age: 74
End: 2019-11-25
Payer: MEDICARE

## 2019-11-25 VITALS — DIASTOLIC BLOOD PRESSURE: 84 MMHG | SYSTOLIC BLOOD PRESSURE: 116 MMHG

## 2019-11-25 DIAGNOSIS — F32.A DEPRESSION, UNSPECIFIED DEPRESSION TYPE: ICD-10-CM

## 2019-11-25 DIAGNOSIS — R74.8 ELEVATED ALKALINE PHOSPHATASE LEVEL: ICD-10-CM

## 2019-11-25 DIAGNOSIS — I10 BENIGN ESSENTIAL HYPERTENSION: ICD-10-CM

## 2019-11-25 DIAGNOSIS — R80.9 PROTEINURIA, UNSPECIFIED TYPE: ICD-10-CM

## 2019-11-25 DIAGNOSIS — I48.0 PAROXYSMAL A-FIB (HCC): ICD-10-CM

## 2019-11-25 DIAGNOSIS — E78.2 MIXED HYPERLIPIDEMIA: ICD-10-CM

## 2019-11-25 DIAGNOSIS — E11.9 TYPE 2 DIABETES MELLITUS WITHOUT COMPLICATION, WITHOUT LONG-TERM CURRENT USE OF INSULIN (HCC): Primary | ICD-10-CM

## 2019-11-25 PROCEDURE — 84080 ASSAY ALKALINE PHOSPHATASES: CPT

## 2019-11-25 PROCEDURE — 36415 COLL VENOUS BLD VENIPUNCTURE: CPT

## 2019-11-25 PROCEDURE — 84075 ASSAY ALKALINE PHOSPHATASE: CPT

## 2019-11-25 PROCEDURE — 99214 OFFICE O/P EST MOD 30 MIN: CPT | Performed by: INTERNAL MEDICINE

## 2019-11-25 NOTE — PROGRESS NOTES
Assessment/Plan:  Regarding diabetes is reasonably well controlled with an A1c of 6 8  LDL cholesterol 94  Positive history of atrial fibrillation  On anticoagulation  No chest pains  She did have an elevated alkaline phosphatase  Will check isoenzymes today  The importance of glycemic control in losing weight was stressed to the patient  Will see her back in 4 months  She already received a flu shot  She will follow up with Cardiology  1  Type 2 diabetes mellitus without complication, without long-term current use of insulin (HCC)  Comprehensive metabolic panel    HEMOGLOBIN A1C W/ EAG ESTIMATION    Lipid panel   2  Elevated alkaline phosphatase level  Alkaline phosphatase, isoenzymes   3  Benign essential hypertension     4  Paroxysmal A-fib ([address] )     5  Depression, unspecified depression type     6  Mixed hyperlipidemia     7  Proteinuria, unspecified type         Orders Placed This Encounter   Procedures    Alkaline phosphatase, isoenzymes    Comprehensive metabolic panel    HEMOGLOBIN A1C W/ EAG ESTIMATION    Lipid panel         Subjective:  She comes in for follow-up  She brought a list of blood sugars  There are running about 150  A1c came down to 6 8  It was 7 7 last when her  She had elevated alkaline phosphatase  Positive history of breast cancer  Generally feels well  Depression is under control  Has essential hypertension paroxysmal atrial fibrillation  Chronically anticoagulated  Patient ID: Dwayne Oakley is a 69 y o  female      HPI    The following portions of the patient's history were reviewed and updated as appropriate:   She has a past medical history of Atrial fibrillation ([address] ), Diabetes mellitus ([address] ), Elevation of level of transaminase and lactic acid dehydrogenase (LDH), Hypercholesterolemia, Hypercholesterolemia, Malignant neoplasm of female breast ([address] ), Pulmonary edema, Septicemia ([address] ), Splenic disorder, and Systemic inflammatory response syndrome (SIRS) due to infectious process  ,  does not have any pertinent problems on file  ,   has a past surgical history that includes Breast reconstruction (Bilateral); Mastectomy; Replacement total knee (Right); Splenectomy; and Tonsillectomy  ,  family history includes Breast cancer in her family; Diabetes in her mother; Kidney disease in her mother  ,   reports that she has never smoked  She has never used smokeless tobacco  She reports that she does not drink alcohol or use drugs  ,  has No Known Allergies       Current Outpatient Medications:     Blood Glucose Monitoring Suppl (ONE TOUCH ULTRA 2) w/Device KIT, Patient to test once daily, Disp: 1 each, Rfl: 0    brimonidine-timolol (COMBIGAN) 0 2-0 5 %, Administer 1 drop to both eyes daily , Disp: , Rfl:     citalopram (CeleXA) 20 mg tablet, TAKE 1 TABLET EVERY DAY, Disp: 90 tablet, Rfl: 1    DILT- MG 24 hr capsule, TAKE 1 CAPSULE EVERY DAY, Disp: 90 capsule, Rfl: 3    ELIQUIS 5 MG, TAKE 1 TABLET TWICE DAILY, Disp: 180 tablet, Rfl: 3    glucose blood (ACCU-CHEK JAMARCUS PLUS) test strip, 1 each by In Vitro route 2 (two) times a day , Disp: , Rfl:     glucose blood (ONE TOUCH ULTRA TEST) test strip, Patient to test once daily (Patient taking differently: 1 each by Other route 2 (two) times a day Patient to test once daily), Disp: 100 each, Rfl: 3    irbesartan (AVAPRO) 150 mg tablet, TAKE 1 TABLET EVERY DAY, Disp: 90 tablet, Rfl: 3    Lancets (ONETOUCH ULTRASOFT) lancets, Patient to test once daily, Disp: 100 each, Rfl: 3    metFORMIN (GLUCOPHAGE) 500 mg tablet, TAKE 1 TABLET EVERY MORNING AND TAKE 2 TABLETS IN THE EVENING, Disp: 270 tablet, Rfl: 4    metoprolol tartrate (LOPRESSOR) 25 mg tablet, TAKE 1 TABLET EVERY DAY, Disp: 90 tablet, Rfl: 3    RESTASIS MULTIDOSE 0 05 % ophthalmic emulsion, Administer 1 drop to both eyes daily , Disp: , Rfl:     simvastatin (ZOCOR) 20 mg tablet, TAKE 1 TABLET EVERY DAY, Disp: 90 tablet, Rfl: 3    Review of Systems Constitutional: Negative for activity change, appetite change, chills, diaphoresis, fatigue, fever and unexpected weight change  HENT: Negative for congestion, ear pain, hearing loss, mouth sores, nosebleeds, postnasal drip, sinus pressure, sinus pain, sore throat and trouble swallowing  Eyes: Negative for pain, discharge and visual disturbance  Respiratory: Negative for apnea, cough, chest tightness, shortness of breath and wheezing  Cardiovascular: Negative for chest pain, palpitations and leg swelling  Positive history of paroxysmal atrial fibrillation  Gastrointestinal: Negative for abdominal pain, anal bleeding, blood in stool, constipation, diarrhea, nausea and vomiting  Remains markedly overweight   Endocrine: Negative for polydipsia and polyphagia  Has type 2 diabetes  A1c is 6 8  Genitourinary: Negative for decreased urine volume, dysuria, flank pain, frequency, hematuria and urgency  Musculoskeletal: Negative for arthralgias, back pain, gait problem, joint swelling and myalgias  Skin: Negative for rash and wound  Allergic/Immunologic: Negative for environmental allergies and food allergies  Neurological: Negative for dizziness, tremors, seizures, syncope, speech difficulty, light-headedness, numbness and headaches  Hematological: Negative for adenopathy  Does not bruise/bleed easily  Psychiatric/Behavioral: Negative for agitation, confusion, hallucinations, sleep disturbance and suicidal ideas  The patient is not nervous/anxious  Objective:  /84 (BP Location: Left arm, Patient Position: Sitting, Cuff Size: Standard)      Physical Exam   Constitutional: She appears well-developed  No distress  72-year-old who is markedly overweight  Blood pressure is 116/84  Heart rhythm is regular  Rate is 70  Respirations are 20  HENT:   Head: Normocephalic     Right Ear: External ear normal    Left Ear: External ear normal    Nose: Nose normal  Mouth/Throat: Oropharynx is clear and moist  No oropharyngeal exudate  Eyes: Pupils are equal, round, and reactive to light  Conjunctivae and EOM are normal  Right eye exhibits no discharge  Left eye exhibits no discharge  Neck: Normal range of motion  Neck supple  No thyromegaly present  Cardiovascular: Normal rate, regular rhythm, normal heart sounds and intact distal pulses  Exam reveals no gallop and no friction rub  No murmur heard  Pulmonary/Chest: Effort normal and breath sounds normal  No respiratory distress  She has no wheezes  She has no rales  Abdominal: Soft  Bowel sounds are normal  She exhibits no distension and no mass  There is no tenderness  There is no rebound and no guarding  Abdomen is obese soft nontender with no masses  Musculoskeletal: Normal range of motion  She exhibits no edema, tenderness or deformity  Lymphadenopathy:     She has no cervical adenopathy  Neurological: She is alert  She has normal reflexes  She displays normal reflexes  No cranial nerve deficit  Coordination normal    Skin: Skin is warm and dry  No rash noted  No erythema  Bilateral mastectomies  Psychiatric: She has a normal mood and affect  Her behavior is normal  Judgment and thought content normal    Nursing note and vitals reviewed          Recent Results (from the past 1008 hour(s))   CBC and differential    Collection Time: 11/19/19  9:11 AM   Result Value Ref Range    WBC 7 98 4 31 - 10 16 Thousand/uL    RBC 4 57 3 81 - 5 12 Million/uL    Hemoglobin 14 2 11 5 - 15 4 g/dL    Hematocrit 43 8 34 8 - 46 1 %    MCV 96 82 - 98 fL    MCH 31 1 26 8 - 34 3 pg    MCHC 32 4 31 4 - 37 4 g/dL    RDW 12 9 11 6 - 15 1 %    MPV 11 3 8 9 - 12 7 fL    Platelets 355 442 - 296 Thousands/uL    nRBC 0 /100 WBCs    Neutrophils Relative 64 43 - 75 %    Immat GRANS % 1 0 - 2 %    Lymphocytes Relative 21 14 - 44 %    Monocytes Relative 9 4 - 12 %    Eosinophils Relative 4 0 - 6 %    Basophils Relative 1 0 - 1 % Neutrophils Absolute 5 15 1 85 - 7 62 Thousands/µL    Immature Grans Absolute 0 04 0 00 - 0 20 Thousand/uL    Lymphocytes Absolute 1 64 0 60 - 4 47 Thousands/µL    Monocytes Absolute 0 74 0 17 - 1 22 Thousand/µL    Eosinophils Absolute 0 34 0 00 - 0 61 Thousand/µL    Basophils Absolute 0 07 0 00 - 0 10 Thousands/µL   Comprehensive metabolic panel    Collection Time: 11/19/19  9:11 AM   Result Value Ref Range    Sodium 139 136 - 145 mmol/L    Potassium 4 6 3 5 - 5 3 mmol/L    Chloride 102 100 - 108 mmol/L    CO2 28 21 - 32 mmol/L    ANION GAP 9 4 - 13 mmol/L    BUN 15 5 - 25 mg/dL    Creatinine 0 93 0 60 - 1 30 mg/dL    Glucose, Fasting 125 (H) 65 - 99 mg/dL    Calcium 9 6 8 3 - 10 1 mg/dL    AST 16 5 - 45 U/L    ALT 19 12 - 78 U/L    Alkaline Phosphatase 135 (H) 46 - 116 U/L    Total Protein 7 9 6 4 - 8 2 g/dL    Albumin 3 5 3 5 - 5 0 g/dL    Total Bilirubin 0 70 0 20 - 1 00 mg/dL    eGFR 62 ml/min/1 73sq m   HEMOGLOBIN A1C W/ EAG ESTIMATION    Collection Time: 11/19/19  9:11 AM   Result Value Ref Range    Hemoglobin A1C 6 8 (H) 4 2 - 6 3 %     mg/dl   Lipid panel    Collection Time: 11/19/19  9:11 AM   Result Value Ref Range    Cholesterol 179 50 - 200 mg/dL    Triglycerides 100 <=150 mg/dL    HDL, Direct 65 >=40 mg/dL    LDL Calculated 94 0 - 100 mg/dL    Non-HDL-Chol (CHOL-HDL) 114 mg/dl   TSH, 3rd generation with Free T4 reflex    Collection Time: 11/19/19  9:11 AM   Result Value Ref Range    TSH 3RD GENERATON 1 340 0 358 - 3 740 uIU/mL   Microalbumin / creatinine urine ratio    Collection Time: 11/19/19  9:18 AM   Result Value Ref Range    Creatinine, Ur 63 3 mg/dL    Microalbum  ,U,Random 165 0 (H) 0 0 - 20 0 mg/L    Microalb Creat Ratio 261 (H) 0 - 30 mg/g creatinine   UA w Reflex to Microscopic w Reflex to Culture - Clinic Collect    Collection Time: 11/19/19  3:55 PM   Result Value Ref Range    Color, UA Yellow     Clarity, UA Clear     Specific [place], UA 1 010 1 003 - 1 030    pH, UA 5 5 4 5, 5 0, 5  5, 6 0, 6 5, 7 0, 7 5, 8 0    Leukocytes, UA Negative Negative    Nitrite, UA Negative Negative    Protein, UA 30 (1+) (A) Negative mg/dl    Glucose, UA Negative Negative mg/dl    Ketones, UA Negative Negative mg/dl    Urobilinogen, UA 0 2 0 2, 1 0 E U /dl E U /dl    Bilirubin, UA Negative Negative    Blood, UA Trace-Intact (A) Negative   Urine Microscopic    Collection Time: 11/19/19  3:55 PM   Result Value Ref Range    RBC, UA 0-1 (A) None Seen, 0-5 /hpf    WBC, UA 0-1 (A) None Seen, 0-5, 5-55, 5-65 /hpf    Epithelial Cells Occasional None Seen, Occasional /hpf    Bacteria, UA Occasional None Seen, Occasional /hpf    MUCUS THREADS Occasional (A) None Seen

## 2019-11-26 ENCOUNTER — HOSPITAL ENCOUNTER (OUTPATIENT)
Dept: MAMMOGRAPHY | Facility: CLINIC | Age: 74
Discharge: HOME | End: 2019-11-26
Payer: MEDICARE

## 2019-11-26 DIAGNOSIS — M81.0 POSTMENOPAUSAL BONE LOSS: ICD-10-CM

## 2019-11-26 PROCEDURE — 77080 DXA BONE DENSITY AXIAL: CPT

## 2019-11-27 LAB
ALP BONE CFR SERPL: 18 % (ref 14–68)
ALP INTEST CFR SERPL: 0 % (ref 0–18)
ALP LIVER CFR SERPL: 82 % (ref 18–85)
ALP SERPL-CCNC: 130 IU/L (ref 39–117)

## 2019-12-30 DIAGNOSIS — I10 HYPERTENSION, ESSENTIAL: ICD-10-CM

## 2020-01-29 ENCOUNTER — APPOINTMENT (OUTPATIENT)
Dept: LAB | Facility: HOSPITAL | Age: 75
End: 2020-01-29
Attending: INTERNAL MEDICINE
Payer: MEDICARE

## 2020-01-29 DIAGNOSIS — R80.9 PROTEINURIA, UNSPECIFIED TYPE: ICD-10-CM

## 2020-01-29 LAB
ANION GAP SERPL CALCULATED.3IONS-SCNC: 10 MMOL/L (ref 4–13)
BACTERIA UR QL AUTO: ABNORMAL /HPF
BASOPHILS # BLD AUTO: 0.08 THOUSANDS/ΜL (ref 0–0.1)
BASOPHILS NFR BLD AUTO: 1 % (ref 0–1)
BILIRUB UR QL STRIP: NEGATIVE
BUN SERPL-MCNC: 19 MG/DL (ref 5–25)
CALCIUM SERPL-MCNC: 9.2 MG/DL (ref 8.3–10.1)
CHLORIDE SERPL-SCNC: 102 MMOL/L (ref 100–108)
CLARITY UR: CLEAR
CO2 SERPL-SCNC: 26 MMOL/L (ref 21–32)
COLOR UR: YELLOW
CREAT SERPL-MCNC: 1 MG/DL (ref 0.6–1.3)
CREAT UR-MCNC: 95.4 MG/DL
EOSINOPHIL # BLD AUTO: 0.34 THOUSAND/ΜL (ref 0–0.61)
EOSINOPHIL NFR BLD AUTO: 4 % (ref 0–6)
ERYTHROCYTE [DISTWIDTH] IN BLOOD BY AUTOMATED COUNT: 12.1 % (ref 11.6–15.1)
GFR SERPL CREATININE-BSD FRML MDRD: 56 ML/MIN/1.73SQ M
GLUCOSE P FAST SERPL-MCNC: 136 MG/DL (ref 65–99)
GLUCOSE UR STRIP-MCNC: NEGATIVE MG/DL
HCT VFR BLD AUTO: 42.5 % (ref 34.8–46.1)
HGB BLD-MCNC: 14 G/DL (ref 11.5–15.4)
HGB UR QL STRIP.AUTO: ABNORMAL
IMM GRANULOCYTES # BLD AUTO: 0.05 THOUSAND/UL (ref 0–0.2)
IMM GRANULOCYTES NFR BLD AUTO: 1 % (ref 0–2)
KETONES UR STRIP-MCNC: NEGATIVE MG/DL
LEUKOCYTE ESTERASE UR QL STRIP: NEGATIVE
LYMPHOCYTES # BLD AUTO: 1.74 THOUSANDS/ΜL (ref 0.6–4.47)
LYMPHOCYTES NFR BLD AUTO: 18 % (ref 14–44)
MCH RBC QN AUTO: 31.7 PG (ref 26.8–34.3)
MCHC RBC AUTO-ENTMCNC: 32.9 G/DL (ref 31.4–37.4)
MCV RBC AUTO: 96 FL (ref 82–98)
MONOCYTES # BLD AUTO: 0.76 THOUSAND/ΜL (ref 0.17–1.22)
MONOCYTES NFR BLD AUTO: 8 % (ref 4–12)
NEUTROPHILS # BLD AUTO: 6.64 THOUSANDS/ΜL (ref 1.85–7.62)
NEUTS SEG NFR BLD AUTO: 68 % (ref 43–75)
NITRITE UR QL STRIP: NEGATIVE
NON-SQ EPI CELLS URNS QL MICRO: ABNORMAL /HPF
NRBC BLD AUTO-RTO: 0 /100 WBCS
PH UR STRIP.AUTO: 5.5 [PH]
PLATELET # BLD AUTO: 330 THOUSANDS/UL (ref 149–390)
PMV BLD AUTO: 11 FL (ref 8.9–12.7)
POTASSIUM SERPL-SCNC: 4.2 MMOL/L (ref 3.5–5.3)
PROT UR STRIP-MCNC: ABNORMAL MG/DL
PROT UR-MCNC: 25 MG/DL
PROT/CREAT UR: 0.26 MG/G{CREAT} (ref 0–0.1)
RBC # BLD AUTO: 4.41 MILLION/UL (ref 3.81–5.12)
RBC #/AREA URNS AUTO: ABNORMAL /HPF
SODIUM SERPL-SCNC: 138 MMOL/L (ref 136–145)
SP GR UR STRIP.AUTO: 1.02 (ref 1–1.03)
UROBILINOGEN UR QL STRIP.AUTO: 0.2 E.U./DL
WBC # BLD AUTO: 9.61 THOUSAND/UL (ref 4.31–10.16)
WBC #/AREA URNS AUTO: ABNORMAL /HPF

## 2020-01-29 PROCEDURE — 85025 COMPLETE CBC W/AUTO DIFF WBC: CPT

## 2020-01-29 PROCEDURE — 80048 BASIC METABOLIC PNL TOTAL CA: CPT

## 2020-01-29 PROCEDURE — 82570 ASSAY OF URINE CREATININE: CPT

## 2020-01-29 PROCEDURE — 36415 COLL VENOUS BLD VENIPUNCTURE: CPT

## 2020-01-29 PROCEDURE — 81001 URINALYSIS AUTO W/SCOPE: CPT

## 2020-01-29 PROCEDURE — 84156 ASSAY OF PROTEIN URINE: CPT

## 2020-02-03 ENCOUNTER — OFFICE VISIT (OUTPATIENT)
Dept: NEPHROLOGY | Facility: CLINIC | Age: 75
End: 2020-02-03
Payer: MEDICARE

## 2020-02-03 VITALS
DIASTOLIC BLOOD PRESSURE: 70 MMHG | BODY MASS INDEX: 39.1 KG/M2 | HEART RATE: 78 BPM | RESPIRATION RATE: 16 BRPM | HEIGHT: 68 IN | WEIGHT: 258 LBS | TEMPERATURE: 97.6 F | SYSTOLIC BLOOD PRESSURE: 120 MMHG

## 2020-02-03 DIAGNOSIS — I10 BENIGN ESSENTIAL HYPERTENSION: ICD-10-CM

## 2020-02-03 DIAGNOSIS — I48.0 PAROXYSMAL A-FIB (HCC): ICD-10-CM

## 2020-02-03 DIAGNOSIS — C50.911 MALIGNANT NEOPLASM OF RIGHT FEMALE BREAST, UNSPECIFIED ESTROGEN RECEPTOR STATUS, UNSPECIFIED SITE OF BREAST (HCC): ICD-10-CM

## 2020-02-03 DIAGNOSIS — E11.9 TYPE 2 DIABETES MELLITUS WITHOUT COMPLICATION, WITHOUT LONG-TERM CURRENT USE OF INSULIN (HCC): Primary | ICD-10-CM

## 2020-02-03 DIAGNOSIS — R80.9 PROTEINURIA, UNSPECIFIED TYPE: ICD-10-CM

## 2020-02-03 DIAGNOSIS — F32.A DEPRESSION, UNSPECIFIED DEPRESSION TYPE: ICD-10-CM

## 2020-02-03 PROCEDURE — 3078F DIAST BP <80 MM HG: CPT | Performed by: INTERNAL MEDICINE

## 2020-02-03 PROCEDURE — 3066F NEPHROPATHY DOC TX: CPT | Performed by: INTERNAL MEDICINE

## 2020-02-03 PROCEDURE — 3074F SYST BP LT 130 MM HG: CPT | Performed by: INTERNAL MEDICINE

## 2020-02-03 PROCEDURE — 1036F TOBACCO NON-USER: CPT | Performed by: INTERNAL MEDICINE

## 2020-02-03 PROCEDURE — 3008F BODY MASS INDEX DOCD: CPT | Performed by: INTERNAL MEDICINE

## 2020-02-03 PROCEDURE — 99214 OFFICE O/P EST MOD 30 MIN: CPT | Performed by: INTERNAL MEDICINE

## 2020-02-03 PROCEDURE — 4040F PNEUMOC VAC/ADMIN/RCVD: CPT | Performed by: INTERNAL MEDICINE

## 2020-02-03 PROCEDURE — 1160F RVW MEDS BY RX/DR IN RCRD: CPT | Performed by: INTERNAL MEDICINE

## 2020-02-03 NOTE — ASSESSMENT & PLAN NOTE
She had history of breast cancer treated with surgery    She is seems to be is cancer free for a while

## 2020-02-03 NOTE — PROGRESS NOTES
NEPHROLOGY OFFICE FOLLOW UP  Anna Marie Mejía 69 y o  female MRN: [id number]    Encounter: [id number] 2/3/2020    REASON FOR VISIT: Anna Marie Mejía is a 69 y o  female who is here on 2/3/2020 for Follow-up; Hypertension; and Proteinuria    HPI:    Vikash Marquez came in today for follow-up of proteinuria  This is yearly visit for her  She is overall doing well  Weight is still steady  Drinking enough liquid and denies taking any nonsteroidal pain killer  No chest pain no palpitation or shortness of breath  No urinary complaint      REVIEW OF SYSTEMS:    Review of Systems   Constitutional: Negative for activity change and fatigue  HENT: Negative for congestion and ear discharge  Eyes: Negative for photophobia and pain  Respiratory: Negative for apnea and choking  Cardiovascular: Negative for chest pain and palpitations  Gastrointestinal: Negative for abdominal distention and blood in stool  Endocrine: Negative for heat intolerance and polyphagia  Genitourinary: Negative for flank pain and urgency  Musculoskeletal: Negative for neck pain and neck stiffness  Skin: Negative for color change and wound  Allergic/Immunologic: Negative for food allergies and immunocompromised state  Neurological: Negative for seizures and facial asymmetry  Hematological: Negative for adenopathy  Does not bruise/bleed easily  Psychiatric/Behavioral: Negative for self-injury and suicidal ideas           PAST MEDICAL HISTORY:  Past Medical History:   Diagnosis Date    Atrial fibrillation ([facility] )     Chronic kidney disease     Diabetes mellitus ([facility] )     Elevation of level of transaminase and lactic acid dehydrogenase (LDH)     Hypercholesterolemia     Last assessed: 6/11/14    Hypercholesterolemia     Mal[facility] female [facility] ([facility] )     Pulmonary edema     Septicemia (HCC)     Splenic disorder     Systemic inflammatory response syndrome (SIRS) due to infectious process        PAST SURGICAL HISTORY:  Past Surgical History:   Procedure Laterality Date    BREAST RECONSTRUCTION Bilateral     w/implant prosthesis    MASTECTOMY      REPLACEMENT TOTAL KNEE Right     SPLENECTOMY      TONSILLECTOMY         SOCIAL HISTORY:  Social History     Substance and Sexual Activity   Alcohol Use No     Social History     Substance and Sexual Activity   Drug Use No     Social History     Tobacco Use   Smoking Status Never Smoker   Smokeless Tobacco Never Used       FAMILY HISTORY:  Family History   Problem Relation Age of Onset    Diabetes Mother         Mellitus    Kidney disease Mother     Breast cancer Family        MEDICATIONS:    Current Outpatient Medications:     Blood Glucose Monitoring Suppl (ONE TOUCH ULTRA 2) w/Device KIT, Patient to test once daily, Disp: 1 each, Rfl: 0    brimonidine-timolol (COMBIGAN) 0 2-0 5 %, Administer 1 drop to both eyes daily , Disp: , Rfl:     citalopram (CeleXA) 20 mg tablet, TAKE 1 TABLET EVERY DAY, Disp: 90 tablet, Rfl: 1    DILT- MG 24 hr capsule, TAKE 1 CAPSULE EVERY DAY, Disp: 90 capsule, Rfl: 3    ELIQUIS 5 MG, TAKE 1 TABLET TWICE DAILY, Disp: 180 tablet, Rfl: 3    glucose blood (ACCU-CHEK JAMARCUS PLUS) test strip, 1 each by In Vitro route 2 (two) times a day , Disp: , Rfl:     glucose blood (ONE TOUCH ULTRA TEST) test strip, Patient to test once daily (Patient taking differently: 1 each by Other route 2 (two) times a day Patient to test once daily), Disp: 100 each, Rfl: 3    irbesartan (AVAPRO) 150 mg tablet, TAKE 1 TABLET EVERY DAY, Disp: 90 tablet, Rfl: 3    Lancets (ONETOUCH ULTRASOFT) lancets, Patient to test once daily, Disp: 100 each, Rfl: 3    metFORMIN (GLUCOPHAGE) 500 mg tablet, TAKE 1 TABLET EVERY MORNING AND TAKE 2 TABLETS IN THE EVENING, Disp: 270 tablet, Rfl: 4    metoprolol tartrate (LOPRESSOR) 25 mg tablet, TAKE 1 TABLET EVERY DAY, Disp: 90 tablet, Rfl: 0    RESTASIS MULTIDOSE 0 05 % ophthalmic emulsion, Administer 1 drop to both eyes daily , Disp: , Rfl:     simvastatin (ZOCOR) 20 mg tablet, TAKE 1 TABLET EVERY DAY, Disp: 90 tablet, Rfl: 3    PHYSICAL EXAM:  Vitals:    02/03/20 0905   BP: 120/70   BP Location: Right arm   Patient Position: Sitting   Pulse: 78   Resp: 16   Temp: 97 6 °F (36 4 °C)   TempSrc: Tympanic   Weight: 117 kg (258 lb)   Height: 5' 8" (1 727 m)     Body mass index is 39 23 kg/m²  Physical Exam   Constitutional: She is oriented to person, place, and time  She appears well-developed  No distress  HENT:   Head: Normocephalic  Mouth/Throat: Oropharynx is clear and moist    Eyes: Conjunctivae are normal  No scleral icterus  Neck: Neck supple  No JVD present  Cardiovascular: Normal rate and normal heart sounds  Pulmonary/Chest: Effort normal  She has no wheezes  Abdominal: Soft  There is no tenderness  Musculoskeletal: Normal range of motion  She exhibits no edema  Neurological: She is alert and oriented to person, place, and time  Skin: Skin is warm  No rash noted  Psychiatric: She has a normal mood and affect   Her behavior is normal        LAB RESULTS:  Results for orders placed or performed in visit on 08/67/55   Basic metabolic panel   Result Value Ref Range    Sodium 138 136 - 145 mmol/L    Potassium 4 2 3 5 - 5 3 mmol/L    Chloride 102 100 - 108 mmol/L    CO2 26 21 - 32 mmol/L    ANION GAP 10 4 - 13 mmol/L    BUN 19 5 - 25 mg/dL    Creatinine 1 00 0 60 - 1 30 mg/dL    Glucose, Fasting 136 (H) 65 - 99 mg/dL    Calcium 9 2 8 3 - 10 1 mg/dL    eGFR 56 ml/min/1 73sq m   CBC and differential   Result Value Ref Range    WBC 9 61 4 31 - 10 16 Thousand/uL    RBC 4 41 3 81 - 5 12 Million/uL    Hemoglobin 14 0 11 5 - 15 4 g/dL    Hematocrit 42 5 34 8 - 46 1 %    MCV 96 82 - 98 fL    MCH 31 7 26 8 - 34 3 pg    MCHC 32 9 31 4 - 37 4 g/dL    RDW 12 1 11 6 - 15 1 %    MPV 11 0 8 9 - 12 7 fL    Platelets 141 668 - 072 Thousands/uL    nRBC 0 /100 WBCs    Neutrophils Relative 68 43 - 75 %    Immat GRANS % 1 0 - 2 % Lymphocytes Relative 18 14 - 44 %    Monocytes Relative 8 4 - 12 %    Eosinophils Relative 4 0 - 6 %    Basophils Relative 1 0 - 1 %    Neutrophils Absolute 6 64 1 85 - 7 62 Thousands/µL    Immature Grans Absolute 0 05 0 00 - 0 20 Thousand/uL    Lymphocytes Absolute 1 74 0 60 - 4 47 Thousands/µL    Monocytes Absolute 0 76 0 17 - 1 22 Thousand/µL    Eosinophils Absolute 0 34 0 00 - 0 61 Thousand/µL    Basophils Absolute 0 08 0 00 - 0 10 Thousands/µL   Protein / creatinine ratio, urine   Result Value Ref Range    Creatinine, Ur 95 4 mg/dL    Protein Urine Random 25 mg/dL    Prot/Creat Ratio, Ur 0 26 (H) 0 00 - 0 10   Urinalysis with reflex to microscopic   Result Value Ref Range    Color, UA Yellow     Clarity, UA Clear     Specific Gravity, UA 1 020 1 003 - 1 030    pH, UA 5 5 4 5, 5 0, 5 5, 6 0, 6 5, 7 0, 7 5, 8 0    Leukocytes, UA Negative Negative    Nitrite, UA Negative Negative    Protein, UA Trace (A) Negative mg/dl    Glucose, UA Negative Negative mg/dl    Ketones, UA Negative Negative mg/dl    Urobilinogen, UA 0 2 0 2, 1 0 E U /dl E U /dl    Bilirubin, UA Negative Negative    Blood, UA Trace-lysed (A) Negative   Urine Microscopic   Result Value Ref Range    RBC, UA 1-2 (A) None Seen, 0-5 /hpf    WBC, UA None Seen None Seen, 0-5, 5-55, 5-65 /hpf    Epithelial Cells Occasional None Seen, Occasional /hpf    Bacteria, UA None Seen None Seen, Occasional /hpf       ASSESSMENT and PLAN:      Type 2 diabetes mellitus without complication (HCC)    Lab Results   Component Value Date    HGBA1C 6 8 (H) 11/19/2019    Diabetes is reasonably well controlled with hemoglobin A1c 6 8  Importance of diabetic control and kidney disease discussed with her    Benign essential hypertension  Blood pressure is very well controlled with ARB blocker  Will continue same medication    Paroxysmal A-fib ([address] )  Patient rate is very well control    She is on diltiazem as well as Eliquis as part of the management    Breast [facility])  She had history of breast cancer treated with surgery  She is seems to be is cancer free for a while    Depression  She is on medication for this and seems to be doing quite well    Proteinuria  She does have proteinuria about 300 mg  Likely etiology is diabetes along with obesity  She is on ARB blocker which I will continue  Importance of weight reduction discussed with her      I discussed all her lab report an medication with her  I will see her back in 1 year again  Will do blood and urine test before that visit        Portions of the record may have been created with voice recognition software  Occasional wrong word or "sound a like" substitutions may have occurred due to the inherent limitations of voice recognition software  Read the chart carefully and recognize, using context, where substitutions have occurred  If you have any questions, please contact the dictating provider

## 2020-02-03 NOTE — ASSESSMENT & PLAN NOTE
Patient rate is very well control    She is on diltiazem as well as Eliquis as part of the management

## 2020-02-03 NOTE — ASSESSMENT & PLAN NOTE
Lab Results   Component Value Date    HGBA1C 6 8 (H) 11/19/2019    Diabetes is reasonably well controlled with hemoglobin A1c 6 8    Importance of diabetic control and kidney disease discussed with her

## 2020-02-03 NOTE — ASSESSMENT & PLAN NOTE
She does have proteinuria about 300 mg  Likely etiology is diabetes along with obesity  She is on ARB blocker which I will continue    Importance of weight reduction discussed with her

## 2020-02-06 ENCOUNTER — OFFICE VISIT (OUTPATIENT)
Dept: CARDIOLOGY CLINIC | Facility: CLINIC | Age: 75
End: 2020-02-06
Payer: MEDICARE

## 2020-02-06 VITALS
OXYGEN SATURATION: 94 % | HEART RATE: 80 BPM | WEIGHT: 256 LBS | HEIGHT: 68 IN | BODY MASS INDEX: 38.8 KG/M2 | DIASTOLIC BLOOD PRESSURE: 84 MMHG | SYSTOLIC BLOOD PRESSURE: 130 MMHG

## 2020-02-06 DIAGNOSIS — E78.2 MIXED HYPERLIPIDEMIA: ICD-10-CM

## 2020-02-06 DIAGNOSIS — I48.20 CHRONIC ATRIAL FIBRILLATION (HCC): Primary | ICD-10-CM

## 2020-02-06 DIAGNOSIS — I10 BENIGN ESSENTIAL HYPERTENSION: ICD-10-CM

## 2020-02-06 DIAGNOSIS — Z79.01 CHRONIC ANTICOAGULATION: ICD-10-CM

## 2020-02-06 PROCEDURE — 99214 OFFICE O/P EST MOD 30 MIN: CPT | Performed by: INTERNAL MEDICINE

## 2020-02-06 PROCEDURE — 3075F SYST BP GE 130 - 139MM HG: CPT | Performed by: INTERNAL MEDICINE

## 2020-02-06 PROCEDURE — 3066F NEPHROPATHY DOC TX: CPT | Performed by: INTERNAL MEDICINE

## 2020-02-06 PROCEDURE — 4040F PNEUMOC VAC/ADMIN/RCVD: CPT | Performed by: INTERNAL MEDICINE

## 2020-02-06 PROCEDURE — 1160F RVW MEDS BY RX/DR IN RCRD: CPT | Performed by: INTERNAL MEDICINE

## 2020-02-06 PROCEDURE — 3079F DIAST BP 80-89 MM HG: CPT | Performed by: INTERNAL MEDICINE

## 2020-02-06 PROCEDURE — 1036F TOBACCO NON-USER: CPT | Performed by: INTERNAL MEDICINE

## 2020-02-06 NOTE — PROGRESS NOTES
[facility] [place]  [id number]  [place] [address]  Cardiology Follow Up    [facility]  1945  [id number]      1  Chronic atrial fibrillation     2  Benign essential hypertension     3  Mixed hyperlipidemia     4  Chronic anticoagulation         Chief Complaint   Patient presents with    Follow-up    Atrial Fibrillation       Interval History:  Patient presents for follow-up visit  Patient denies any history of chest pain shortness of breath  Patient denies any history of leg edema or orthopnea PND  No history of presyncope syncope  Patient states compliance with the present list of medications  Patient denies any bleeding issues  Patient is on rate control on anticoagulation for atrial fibrillation  Patient Active Problem List   Diagnosis    Benign essential hypertension    [facility]    Depression    Hyperlipidemia    Obesity    Paroxysmal A-fib (HCC)    Proteinuria    Type 2 diabetes mellitus (HCC)    Chronic anticoagulation    Type 2 diabetes mellitus without complication (HCC)     Past Medical History:   Diagnosis Date    Atrial fibrillation (HCC)     Chronic kidney disease     [facility]     Elevation of level of transaminase and lactic acid dehydrogenase (LDH)     Hypercholesterolemia     Last assessed: 6/11/14    Hypercholesterolemia     Malignant neoplasm of female breast ([facility]     Pulmonary edema     Septicemia (HCC)     Splenic disorder     Systemic inflammatory response syndrome (SIRS) due to infectious process      Social History     Socioeconomic History    Marital status:       Spouse name: Not on file    Number of children: Not on file    Years of education: Not on file    Highest education level: Not on file   Occupational History    Occupation: Retired   Social Needs    Financial resource strain: Not on file    Food insecurity:     Worry: Not on file     Inability: Not on file   [employer] needs: Medical: Not on file     Non-medical: Not on file   Tobacco Use    Smoking status: Never Smoker    Smokeless tobacco: Never Used   Substance and Sexual Activity    Alcohol use: No    Drug use: No    Sexual activity: Not Currently     Partners: Male   Lifestyle    Physical activity:     Days per week: 0 days     Minutes per session: 0 min    Stress: Not at all   Relationships    Social connections:     Talks on phone: Not on file     Gets together: Not on file     Attends Oriental orthodox service: Not on file     Active member of club or organization: Not on file     Attends meetings of clubs or organizations: Not on file     Relationship status: Not on file    Intimate partner violence:     Fear of current or ex partner: Not on file     Emotionally abused: Not on file     Physically abused: Not on file     Forced sexual activity: Not on file   Other Topics Concern    Not on file   Social History Narrative    Always uses seatbelt    Living independently w/spouse      Family History   Problem Relation Age of Onset    Diabetes Mother         Mellitus    Kidney disease Mother     Breast cancer Family      Past Surgical History:   Procedure Laterality Date    BREAST RECONSTRUCTION Bilateral     w/implant prosthesis    MASTECTOMY      REPLACEMENT TOTAL KNEE Right     SPLENECTOMY      TONSILLECTOMY         Current Outpatient Medications:     Blood Glucose Monitoring Suppl (ONE TOUCH ULTRA 2) w/Device KIT, Patient to test once daily, Disp: 1 each, Rfl: 0    brimonidine-timolol (COMBIGAN) 0 2-0 5 %, Administer 1 drop to both eyes daily , Disp: , Rfl:     citalopram (CeleXA) 20 mg tablet, TAKE 1 TABLET EVERY DAY, Disp: 90 tablet, Rfl: 1    DILT- MG 24 hr capsule, TAKE 1 CAPSULE EVERY DAY, Disp: 90 capsule, Rfl: 3    ELIQUIS 5 MG, TAKE 1 TABLET TWICE DAILY, Disp: 180 tablet, Rfl: 3    glucose blood (ACCU-CHEK JAMARCUS PLUS) test strip, 1 each by In Vitro route 2 (two) times a day , Disp: , Rfl:     glucose blood (ONE TOUCH ULTRA TEST) test strip, Patient to test once daily (Patient taking differently: 1 each by Other route 2 (two) times a day Patient to test once daily), Disp: 100 each, Rfl: 3    irbesartan (AVAPRO) 150 mg tablet, TAKE 1 TABLET EVERY DAY, Disp: 90 tablet, Rfl: 3    Lancets (ONETOUCH ULTRASOFT) lancets, Patient to test once daily, Disp: 100 each, Rfl: 3    metFORMIN (GLUCOPHAGE) 500 mg tablet, TAKE 1 TABLET EVERY MORNING AND TAKE 2 TABLETS IN THE EVENING, Disp: 270 tablet, Rfl: 4    metoprolol tartrate (LOPRESSOR) 25 mg tablet, TAKE 1 TABLET EVERY DAY, Disp: 90 tablet, Rfl: 0    RESTASIS MULTIDOSE 0 05 % ophthalmic emulsion, Administer 1 drop to both eyes daily , Disp: , Rfl:     simvastatin (ZOCOR) 20 mg tablet, TAKE 1 TABLET EVERY DAY, Disp: 90 tablet, Rfl: 3  No Known Allergies    Labs:  Appointment on 01/29/2020   Component Date Value    Sodium 01/29/2020 138     Potassium 01/29/2020 4 2     Chloride 01/29/2020 102     CO2 01/29/2020 26     ANION GAP 01/29/2020 10     BUN 01/29/2020 19     Creatinine 01/29/2020 1 00     Glucose, Fasting 01/29/2020 136*    Calcium 01/29/2020 9 2     eGFR 01/29/2020 56     WBC 01/29/2020 9 61     RBC 01/29/2020 4 41     Hemoglobin 01/29/2020 14 0     Hematocrit 01/29/2020 42 5     MCV 01/29/2020 96     MCH 01/29/2020 31 7     MCHC 01/29/2020 32 9     RDW 01/29/2020 12 1     MPV 01/29/2020 11 0     Platelets 94/61/1680 330     nRBC 01/29/2020 0     Neutrophils Relative 01/29/2020 68     Immat GRANS % 01/29/2020 1     Lymphocytes Relative 01/29/2020 18     Monocytes Relative 01/29/2020 8     Eosinophils Relative 01/29/2020 4     Basophils Relative 01/29/2020 1     Neutrophils Absolute 01/29/2020 6 64     Immature Grans Absolute 01/29/2020 0 05     Lymphocytes Absolute 01/29/2020 1 74     Monocytes Absolute 01/29/2020 0 76     Eosinophils Absolute 01/29/2020 0 34     Basophils Absolute 01/29/2020 0 08     Creatinine, Ur 01/29/2020 95 4     Protein Urine Random 01/29/2020 25     Prot/Creat Ratio, Ur 01/29/2020 0 26*    Color, UA 01/29/2020 Yellow     Clarity, UA 01/29/2020 Clear     Specific Gravity, UA 01/29/2020 1 020     pH, UA 01/29/2020 5 5     Leukocytes, UA 01/29/2020 Negative     Nitrite, UA 01/29/2020 Negative     Protein, UA 01/29/2020 Trace*    Glucose, UA 01/29/2020 Negative     Ketones, UA 01/29/2020 Negative     Urobilinogen, UA 01/29/2020 0 2     Bilirubin, UA 01/29/2020 Negative     Blood, UA 01/29/2020 Trace-lysed*    RBC, UA 01/29/2020 1-2*    WBC, UA 01/29/2020 None Seen     Epithelial Cells 01/29/2020 Occasional     Bacteria, UA 01/29/2020 None Seen      Imaging: No results found  Review of Systems:  Review of Systems   REVIEW OF SYSTEMS:  Constitutional:  Denies fever or chills   Eyes:  Denies change in visual acuity   HENT:  Denies nasal congestion or sore throat   Respiratory:  Denies cough or shortness of breath   Cardiovascular:  Denies chest pain or edema   GI:  Denies abdominal pain, nausea, vomiting, bloody stools or diarrhea   :  Denies dysuria, frequency, difficulty in micturition and nocturia  Musculoskeletal:  Denies back pain or joint pain   Neurologic:  Denies headache, focal weakness or sensory changes   Endocrine:  Denies polyuria or polydipsia   Lymphatic:  Denies swollen glands   Psychiatric:  Denies depression or anxiety     Physical Exam:    /84   Pulse 80   Ht 5' 8" (1 727 m)   Wt 116 kg (256 lb)   SpO2 94%   BMI 38 92 kg/m²     Physical Exam  PHYSICAL EXAM:  General:  Patient is not in acute distress   Head: Normocephalic, Atraumatic  HEENT:  Both pupils normal-size atraumatic, normocephalic, nonicteric  Neck:  JVP not raised  Trachea central  No carotid bruit  Respiratory:  normal breath sounds no crackles  no rhonchi  Cardiovascular:  Irregularly irregular  GI:  Abdomen soft nontender  No organomegaly     Lymphatic:  No cervical or inguinal lymphadenopathy  Neurologic:  Patient is awake alert, oriented   Grossly nonfocal  Extremities trace edema    Discussion/Summary:  Patient with multiple medical problems who seems to be doing reasonably well from cardiac standpoint  Previous studies reviewed with patient  Medications reviewed and possible side effects discussed  concepts of cardiovascular disease , signs and symptoms of heart disease  Dietary and risk factor modification reinforced  All questions answered  Safety measures reviewed  Patient advised to report any problems prompting medical attention  Risks and benefits  and alternativesof anticoagulation to prevent thromboembolic risk from atrial fibrillation discussed at length  Patient to report any bleeding issues  Follow-up in 6 months  Follow-up with primary care physician  Patient is agreeable with the plan of care

## 2020-03-09 DIAGNOSIS — F41.9 ANXIETY AND DEPRESSION: ICD-10-CM

## 2020-03-09 DIAGNOSIS — F32.A ANXIETY AND DEPRESSION: ICD-10-CM

## 2020-03-09 DIAGNOSIS — I10 HYPERTENSION, ESSENTIAL: ICD-10-CM

## 2020-03-10 RX ORDER — CITALOPRAM 20 MG/1
TABLET ORAL
Qty: 90 TABLET | Refills: 1 | Status: SHIPPED | OUTPATIENT
Start: 2020-03-10 | End: 2020-08-27

## 2020-03-17 DIAGNOSIS — I10 ESSENTIAL HYPERTENSION: ICD-10-CM

## 2020-03-17 RX ORDER — DILTIAZEM HYDROCHLORIDE 240 MG/1
CAPSULE, EXTENDED RELEASE ORAL
Qty: 90 CAPSULE | Refills: 3 | Status: SHIPPED | OUTPATIENT
Start: 2020-03-17 | End: 2021-01-06 | Stop reason: SDUPTHER

## 2020-04-20 DIAGNOSIS — I10 ESSENTIAL HYPERTENSION: ICD-10-CM

## 2020-04-21 RX ORDER — IRBESARTAN 150 MG/1
TABLET ORAL
Qty: 90 TABLET | Refills: 3 | Status: SHIPPED | OUTPATIENT
Start: 2020-04-21 | End: 2021-01-26 | Stop reason: SDUPTHER

## 2020-04-28 DIAGNOSIS — E78.2 COMBINED HYPERLIPIDEMIA: ICD-10-CM

## 2020-04-28 RX ORDER — SIMVASTATIN 20 MG
TABLET ORAL
Qty: 90 TABLET | Refills: 3 | Status: SHIPPED | OUTPATIENT
Start: 2020-04-28 | End: 2021-03-03 | Stop reason: SDUPTHER

## 2020-05-01 DIAGNOSIS — I10 HYPERTENSION, ESSENTIAL: ICD-10-CM

## 2020-07-09 DIAGNOSIS — I10 HYPERTENSION, ESSENTIAL: ICD-10-CM

## 2020-07-15 ENCOUNTER — TELEPHONE (OUTPATIENT)
Dept: INTERNAL MEDICINE CLINIC | Facility: CLINIC | Age: 75
End: 2020-07-15

## 2020-07-15 NOTE — TELEPHONE ENCOUNTER
PT  SAID  SHE  IS  TO  GET    2 SHOTS  EVERY  5 YEARS   BECAUSE  SHE  DOESN'T  HAVE  A  SPLEEN   PT  DOESN'T   KNOW  THE  NAME  OF  SHOTS   WANTS   TO  KNOW  IF  WE  HAVE   THE  SHOT  AND  IF  SHE  CAN  MAKE  AN  APPT   ALSO   SHE  NEEDS  A  LAB  SLIP  TO  HAVE  SOME  BLOOD  WORK  DONE    CALL PT   [phone]

## 2020-07-17 DIAGNOSIS — I48.20 CHRONIC ATRIAL FIBRILLATION (HCC): ICD-10-CM

## 2020-07-20 RX ORDER — APIXABAN 5 MG/1
TABLET, FILM COATED ORAL
Qty: 180 TABLET | Refills: 3 | Status: SHIPPED | OUTPATIENT
Start: 2020-07-20 | End: 2021-07-01

## 2020-08-14 DIAGNOSIS — E11.9 TYPE 2 DIABETES MELLITUS WITHOUT COMPLICATION, WITHOUT LONG-TERM CURRENT USE OF INSULIN (HCC): ICD-10-CM

## 2020-08-27 DIAGNOSIS — F32.A ANXIETY AND DEPRESSION: ICD-10-CM

## 2020-08-27 DIAGNOSIS — F41.9 ANXIETY AND DEPRESSION: ICD-10-CM

## 2020-08-27 RX ORDER — CITALOPRAM 20 MG/1
TABLET ORAL
Qty: 90 TABLET | Refills: 1 | Status: SHIPPED | OUTPATIENT
Start: 2020-08-27 | End: 2021-01-06 | Stop reason: SDUPTHER

## 2020-08-31 ENCOUNTER — TELEPHONE (OUTPATIENT)
Dept: INTERNAL MEDICINE CLINIC | Facility: CLINIC | Age: 75
End: 2020-08-31

## 2020-08-31 NOTE — TELEPHONE ENCOUNTER
[employer] form was sent 8/20 for one touch ultra test strips    patient is at pharmacy now and needs the test strips    has the form been completed ???    Fax [phone]

## 2020-09-08 ENCOUNTER — APPOINTMENT (OUTPATIENT)
Dept: LAB | Facility: HOSPITAL | Age: 75
End: 2020-09-08
Attending: INTERNAL MEDICINE
Payer: MEDICARE

## 2020-09-08 DIAGNOSIS — R80.9 PROTEINURIA, UNSPECIFIED TYPE: ICD-10-CM

## 2020-09-08 DIAGNOSIS — E11.9 TYPE 2 DIABETES MELLITUS WITHOUT COMPLICATION, WITHOUT LONG-TERM CURRENT USE OF INSULIN (HCC): ICD-10-CM

## 2020-09-08 LAB
ANION GAP SERPL CALCULATED.3IONS-SCNC: 7 MMOL/L (ref 4–13)
BACTERIA UR QL AUTO: ABNORMAL /HPF
BASOPHILS # BLD AUTO: 0.04 THOUSANDS/ΜL (ref 0–0.1)
BASOPHILS NFR BLD AUTO: 1 % (ref 0–1)
BILIRUB UR QL STRIP: NEGATIVE
BUN SERPL-MCNC: 12 MG/DL (ref 5–25)
CALCIUM SERPL-MCNC: 9.5 MG/DL (ref 8.3–10.1)
CHLORIDE SERPL-SCNC: 104 MMOL/L (ref 100–108)
CHOLEST SERPL-MCNC: 171 MG/DL (ref 50–200)
CLARITY UR: CLEAR
CO2 SERPL-SCNC: 31 MMOL/L (ref 21–32)
COLOR UR: YELLOW
CREAT SERPL-MCNC: 0.99 MG/DL (ref 0.6–1.3)
CREAT UR-MCNC: 134 MG/DL
EOSINOPHIL # BLD AUTO: 0.37 THOUSAND/ΜL (ref 0–0.61)
EOSINOPHIL NFR BLD AUTO: 5 % (ref 0–6)
ERYTHROCYTE [DISTWIDTH] IN BLOOD BY AUTOMATED COUNT: 12.8 % (ref 11.6–15.1)
GFR SERPL CREATININE-BSD FRML MDRD: 57 ML/MIN/1.73SQ M
GLUCOSE P FAST SERPL-MCNC: 130 MG/DL (ref 65–99)
GLUCOSE UR STRIP-MCNC: NEGATIVE MG/DL
HCT VFR BLD AUTO: 42 % (ref 34.8–46.1)
HDLC SERPL-MCNC: 68 MG/DL
HGB BLD-MCNC: 13.4 G/DL (ref 11.5–15.4)
HGB UR QL STRIP.AUTO: ABNORMAL
HYALINE CASTS #/AREA URNS LPF: ABNORMAL /LPF
IMM GRANULOCYTES # BLD AUTO: 0.04 THOUSAND/UL (ref 0–0.2)
IMM GRANULOCYTES NFR BLD AUTO: 1 % (ref 0–2)
KETONES UR STRIP-MCNC: NEGATIVE MG/DL
LDLC SERPL CALC-MCNC: 81 MG/DL (ref 0–100)
LEUKOCYTE ESTERASE UR QL STRIP: ABNORMAL
LYMPHOCYTES # BLD AUTO: 1.64 THOUSANDS/ΜL (ref 0.6–4.47)
LYMPHOCYTES NFR BLD AUTO: 21 % (ref 14–44)
MCH RBC QN AUTO: 30.9 PG (ref 26.8–34.3)
MCHC RBC AUTO-ENTMCNC: 31.9 G/DL (ref 31.4–37.4)
MCV RBC AUTO: 97 FL (ref 82–98)
MONOCYTES # BLD AUTO: 0.8 THOUSAND/ΜL (ref 0.17–1.22)
MONOCYTES NFR BLD AUTO: 10 % (ref 4–12)
NEUTROPHILS # BLD AUTO: 5.06 THOUSANDS/ΜL (ref 1.85–7.62)
NEUTS SEG NFR BLD AUTO: 62 % (ref 43–75)
NITRITE UR QL STRIP: NEGATIVE
NON-SQ EPI CELLS URNS QL MICRO: ABNORMAL /HPF
NONHDLC SERPL-MCNC: 103 MG/DL
NRBC BLD AUTO-RTO: 0 /100 WBCS
PH UR STRIP.AUTO: 6 [PH]
PLATELET # BLD AUTO: 282 THOUSANDS/UL (ref 149–390)
PMV BLD AUTO: 11.3 FL (ref 8.9–12.7)
POTASSIUM SERPL-SCNC: 4.7 MMOL/L (ref 3.5–5.3)
PROT UR STRIP-MCNC: ABNORMAL MG/DL
PROT UR-MCNC: 36 MG/DL
PROT/CREAT UR: 0.27 MG/G{CREAT} (ref 0–0.1)
RBC # BLD AUTO: 4.34 MILLION/UL (ref 3.81–5.12)
RBC #/AREA URNS AUTO: ABNORMAL /HPF
SODIUM SERPL-SCNC: 142 MMOL/L (ref 136–145)
SP GR UR STRIP.AUTO: 1.02 (ref 1–1.03)
TRIGL SERPL-MCNC: 109 MG/DL
UROBILINOGEN UR QL STRIP.AUTO: 0.2 E.U./DL
WBC # BLD AUTO: 7.95 THOUSAND/UL (ref 4.31–10.16)
WBC #/AREA URNS AUTO: ABNORMAL /HPF

## 2020-09-08 PROCEDURE — 80061 LIPID PANEL: CPT

## 2020-09-08 PROCEDURE — 85025 COMPLETE CBC W/AUTO DIFF WBC: CPT

## 2020-09-08 PROCEDURE — 80048 BASIC METABOLIC PNL TOTAL CA: CPT

## 2020-09-08 PROCEDURE — 81001 URINALYSIS AUTO W/SCOPE: CPT

## 2020-09-08 PROCEDURE — 84156 ASSAY OF PROTEIN URINE: CPT

## 2020-09-08 PROCEDURE — 82570 ASSAY OF URINE CREATININE: CPT

## 2020-09-08 PROCEDURE — 36415 COLL VENOUS BLD VENIPUNCTURE: CPT

## 2020-09-11 ENCOUNTER — OFFICE VISIT (OUTPATIENT)
Dept: INTERNAL MEDICINE CLINIC | Facility: CLINIC | Age: 75
End: 2020-09-11
Payer: MEDICARE

## 2020-09-11 VITALS
TEMPERATURE: 97.1 F | HEIGHT: 68 IN | BODY MASS INDEX: 38.92 KG/M2 | HEART RATE: 84 BPM | OXYGEN SATURATION: 95 % | SYSTOLIC BLOOD PRESSURE: 138 MMHG | DIASTOLIC BLOOD PRESSURE: 78 MMHG

## 2020-09-11 DIAGNOSIS — F32.A DEPRESSION, UNSPECIFIED DEPRESSION TYPE: ICD-10-CM

## 2020-09-11 DIAGNOSIS — I10 BENIGN ESSENTIAL HYPERTENSION: ICD-10-CM

## 2020-09-11 DIAGNOSIS — I48.0 PAROXYSMAL A-FIB (HCC): ICD-10-CM

## 2020-09-11 DIAGNOSIS — C50.911 MALIGNANT NEOPLASM OF RIGHT FEMALE BREAST, UNSPECIFIED ESTROGEN RECEPTOR STATUS, UNSPECIFIED SITE OF BREAST (HCC): ICD-10-CM

## 2020-09-11 DIAGNOSIS — E78.2 MIXED HYPERLIPIDEMIA: ICD-10-CM

## 2020-09-11 DIAGNOSIS — R82.81 PYURIA: ICD-10-CM

## 2020-09-11 DIAGNOSIS — E11.9 TYPE 2 DIABETES MELLITUS WITHOUT COMPLICATION, WITHOUT LONG-TERM CURRENT USE OF INSULIN (HCC): Primary | ICD-10-CM

## 2020-09-11 DIAGNOSIS — Z11.59 ENCOUNTER FOR HEPATITIS C SCREENING TEST FOR LOW RISK PATIENT: ICD-10-CM

## 2020-09-11 DIAGNOSIS — Z79.01 CHRONIC ANTICOAGULATION: ICD-10-CM

## 2020-09-11 DIAGNOSIS — Z23 IMMUNIZATION DUE: ICD-10-CM

## 2020-09-11 DIAGNOSIS — E11.8 TYPE 2 DIABETES MELLITUS WITH COMPLICATION, WITHOUT LONG-TERM CURRENT USE OF INSULIN (HCC): ICD-10-CM

## 2020-09-11 PROCEDURE — 99214 OFFICE O/P EST MOD 30 MIN: CPT | Performed by: INTERNAL MEDICINE

## 2020-09-11 PROCEDURE — G0009 ADMIN PNEUMOCOCCAL VACCINE: HCPCS | Performed by: INTERNAL MEDICINE

## 2020-09-11 PROCEDURE — 90670 PCV13 VACCINE IM: CPT | Performed by: INTERNAL MEDICINE

## 2020-09-11 NOTE — PROGRESS NOTES
Assessment and Plan:  The patient will get a Prevnar 13  She will get a flu vaccine in about 2 weeks  Will check an hepatitis C urinalysis as well as an A1c  She will continue her current medicines and be seen by new practitioner and several months  Problem List Items Addressed This Visit     None           Preventive health issues were discussed with patient, and age appropriate screening tests were ordered as noted in patient's After Visit Summary  Personalized health advice and appropriate referrals for health education or preventive services given if needed, as noted in patient's After Visit Summary       History of Present Illness:     Patient presents for Medicare Annual Wellness visit    Patient Care Team:  Jia Reese DO as PCP - General  Jia Reese, MD Freya Zhou MD Channing Ginsberg, MD Larence Ferrara, MD     Problem List:     Patient Active Problem List   Diagnosis    Benign essential hypertension    [facility] ([facility]    Depression    Hyperlipidemia    Obesity    [facility]    Proteinuria    Type 2 diabetes mellitus ([facility] )    Chronic anticoagulation    Type 2 diabetes mellitus without complication [facility])      Past Medical and Surgical History:     Past Medical History:   Diagnosis Date    Atrial fibrillation [facility]     Chronic kidney disease     Diabetes mellitus [facility]     Elevation of level of transaminase and lactic acid dehydrogenase (LDH)     Hypercholesterolemia     Last assessed: 6/11/14    Hypercholesterolemia     Malignant neoplasm of female breast ([facility] )     Pulmonary edema     Septicemia ([facility]     Splenic disorder     Systemic inflammatory response syndrome (SIRS) due to infectious process      Past Surgical History:   Procedure Laterality Date    BREAST RECONSTRUCTION Bilateral     w/implant prosthesis    MASTECTOMY      REPLACEMENT TOTAL KNEE Right     SPLENECTOMY      TONSILLECTOMY        Family History:     Family History   Problem Relation Age of Onset    Diabetes Mother         Mellitus    Kidney disease Mother     Breast cancer Family       Social History:     E-Cigarette/Vaping    E-Cigarette Use Never User      E-Cigarette/Vaping Substances    Nicotine No     THC No     CBD No     Flavoring No     Other No     Unknown No      Social History     Socioeconomic History    Marital status:       Spouse name: None    Number of children: None    Years of education: None    Highest education level: None   Occupational History    Occupation: Retired   Social Needs    Financial resource strain: None    Food insecurity     Worry: None     Inability: None    Transportation needs     Medical: None     Non-medical: None   Tobacco Use    Smoking status: Never Smoker    Smokeless tobacco: Never Used   Substance and Sexual Activity    Alcohol use: No    Drug use: No    Sexual activity: Not Currently     Partners: Male   Lifestyle    Physical activity     Days per week: 0 days     Minutes per session: 0 min    Stress: Not at all   Relationships    Social connections     Talks on phone: None     Gets together: None     Attends Yazdanism service: None     Active member of club or organization: None     Attends meetings of clubs or organizations: None     Relationship status: None    Intimate partner violence     Fear of current or ex partner: None     Emotionally abused: None     Physically abused: None     Forced sexual activity: None   Other Topics Concern    None   Social History Narrative    Always uses seatbelt    Living independently w/spouse      Medications and Allergies:     Current Outpatient Medications   Medication Sig Dispense Refill    Blood Glucose Monitoring Suppl (ONE TOUCH ULTRA 2) w/Device KIT Patient to test once daily 1 each 0    brimonidine-timolol (COMBIGAN) 0 2-0 5 % Administer 1 drop to both eyes daily       citalopram (CeleXA) 20 mg tablet TAKE 1 TABLET EVERY DAY 90 tablet 1    DILT- MG 24 hr capsule TAKE 1 CAPSULE EVERY DAY 90 capsule 3    ELIQUIS 5 MG TAKE 1 TABLET TWICE DAILY 180 tablet 3    glucose blood (ONE TOUCH ULTRA TEST) test strip Patient to test once daily (Patient taking differently: 1 each by Other route 2 (two) times a day Patient to test once daily) 100 each 3    irbesartan (AVAPRO) 150 mg tablet TAKE 1 TABLET EVERY DAY 90 tablet 3    Lancets (ONETOUCH ULTRASOFT) lancets Patient to test once daily 100 each 3    metFORMIN (GLUCOPHAGE) 500 mg tablet TAKE 1 TABLET EVERY MORNING AND TAKE 2 TABLETS IN THE EVENING 270 tablet 4    metoprolol tartrate (LOPRESSOR) 25 mg tablet TAKE 1 TABLET EVERY DAY 90 tablet 3    RESTASIS MULTIDOSE 0 05 % ophthalmic emulsion Administer 1 drop to both eyes daily       simvastatin (ZOCOR) 20 mg tablet TAKE 1 TABLET EVERY DAY 90 tablet 3     No current facility-administered medications for this visit        No Known Allergies   Immunizations:     Immunization History   Administered Date(s) Administered    INFLUENZA 09/24/2014, 10/17/2015, 12/27/2016, 10/20/2017, 10/04/2018    Influenza Split High Dose Preservative Free IM 12/27/2016    Influenza TIV (IM) 09/24/2014    Meningococcal, Unknown Serogroups 02/14/2017    Pneumococcal Polysaccharide PPV23 02/14/2017    Tdap 1945    influenza, trivalent, adjuvanted 10/08/2019      Health Maintenance:         Topic Date Due    Hepatitis C Screening  1945    MAMMOGRAM  1945    DXA SCAN  11/26/2021         Topic Date Due    Meningococcal ACWY Vaccine (1 - Risk start before 7 months 4-dose series) 1945    HIB Vaccine (1 of 1 - Risk 1-dose series) 09/15/1948    DTaP,Tdap,and Td Vaccines (1 - Tdap) 06/15/1968    Influenza Vaccine  07/01/2020      Medicare Health Risk Assessment:     /78 (BP Location: Left arm, Patient Position: Sitting, Cuff Size: Standard)   Pulse 84   Temp (!) 97 1 °F (36 2 °C)   Ht 5' 8" (1 727 m)   SpO2 95%   BMI 38 92 kg/m² Depression Screening:   PHQ-2 Score: 2      PREVENTIVE SCREENINGS      Cardiovascular Screening:    General: Screening Not Indicated and History Lipid Disorder      Diabetes Screening:     General: Screening Not Indicated and History Diabetes      Colorectal Cancer Screening:     General: Screening Current      Breast Cancer Screening:     General: History Breast Cancer      Cervical Cancer Screening:    General: Screening Not Indicated      Osteoporosis Screening:    General: Screening Not Indicated and History Osteoporosis      Lung Cancer Screening:     General: Screening Not Indicated      Candy Miller DO

## 2020-09-14 NOTE — PROGRESS NOTES
Assessment/Plan:  Review of the lab reveals an LDL cholesterol of 81  Blood sugars 130  Creatinine 0 99  Patient reasonably stable from a cardiopulmonary standpoint  No chest pain  Blood pressure a little on the high side  Needs to lose weight and this was stressed to the patient  Will give her Prevnar 13 today  She will follow-up with a flu shot in a couple of weeks  Check A1c urinalysis and hepatitis C  She will be seen by new practitioner in several months  1  Type 2 diabetes mellitus without complication, without long-term current use of insulin ([facility])  HEMOGLOBIN A1C W/ EAG ESTIMATION   2  Benign essential hypertension     3  Paroxysmal A-fib ([facility] [address] )     4  Malignant neoplasm of right female breast, unspecified estrogen receptor status, unspecified site of breast ([place] [address] )     5  Depression, unspecified depression type     6  Chronic anticoagulation     7  Mixed hyperlipidemia     8  Pyuria  UA w Reflex to Microscopic w Reflex to Culture - Clinic Collect   9  Type 2 diabetes mellitus with complication, without long-term current use of insulin ([place] [address] )     10  Encounter for hepatitis C screening test for low risk patient  Hepatitis C antibody   11  Immunization due  PNEUMOCOCCAL CONJUGATE VACCINE 13-VALENT GREATER THAN 6 MONTHS       Orders Placed This Encounter   Procedures    PNEUMOCOCCAL CONJUGATE VACCINE 13-VALENT GREATER THAN 6 MONTHS    UA w Reflex to Microscopic w Reflex to Culture - Clinic Collect    HEMOGLOBIN A1C W/ EAG ESTIMATION    Hepatitis C antibody            Subjective:  She comes in for follow-up  Doing reasonably well at this time  Problems include 1  Diabetes 2  Atrial fibrillation 3  History of breast cancer 4  History of depression 5  Chronic anticoagulation  Patient ID: Karlene Duran is a 70 y o  female  HPI no exertional chest pain pressure squeezing or tightness  She remains overweight          The following portions of the patient's history were reviewed and updated as appropriate:   She has a past medical history of Atrial fibrillation ([facility] [address] ), Chronic kidney disease, Diabetes mellitus ([facility] [address] ), Elevation of level of transaminase and lactic acid dehydrogenase (LDH), Hypercholesterolemia, Hypercholesterolemia, Malignant neoplasm of female breast ([facility] [address] ), Pulmonary edema, Septicemia ([facility] [address] ), Splenic disorder, and Systemic inflammatory response syndrome (SIRS) due to infectious process  ,  does not have any pertinent problems on file  ,   has a past surgical history that includes Breast reconstruction (Bilateral); Mastectomy; Replacement total knee (Right); Splenectomy; and Tonsillectomy  ,  family history includes Breast cancer in her family; Diabetes in her mother; Kidney disease in her mother  ,   reports that she has never smoked  She has never used smokeless tobacco  She reports that she does not drink alcohol or use drugs  ,  has No Known Allergies       Current Outpatient Medications:     Blood Glucose Monitoring Suppl (ONE TOUCH ULTRA 2) w/Device KIT, Patient to test once daily, Disp: 1 each, Rfl: 0    brimonidine-timolol (COMBIGAN) 0 2-0 5 %, Administer 1 drop to both eyes daily , Disp: , Rfl:     citalopram (CeleXA) 20 mg tablet, TAKE 1 TABLET EVERY DAY, Disp: 90 tablet, Rfl: 1    DILT- MG 24 hr capsule, TAKE 1 CAPSULE EVERY DAY, Disp: 90 capsule, Rfl: 3    ELIQUIS 5 MG, TAKE 1 TABLET TWICE DAILY, Disp: 180 tablet, Rfl: 3    glucose blood (ONE TOUCH ULTRA TEST) test strip, Patient to test once daily (Patient taking differently: 1 each by Other route 2 (two) times a day Patient to test once daily), Disp: 100 each, Rfl: 3    irbesartan (AVAPRO) 150 mg tablet, TAKE 1 TABLET EVERY DAY, Disp: 90 tablet, Rfl: 3    Lancets (ONETOUCH ULTRASOFT) lancets, Patient to test once daily, Disp: 100 each, Rfl: 3    metFORMIN (GLUCOPHAGE) 500 mg tablet, TAKE 1 TABLET EVERY MORNING AND TAKE 2 TABLETS IN THE EVENING, Disp: 270 tablet, Rfl: 4    metoprolol tartrate (LOPRESSOR) 25 mg tablet, TAKE 1 TABLET EVERY DAY, Disp: 90 tablet, Rfl: 3    RESTASIS MULTIDOSE 0 05 % ophthalmic emulsion, Administer 1 drop to both eyes daily , Disp: , Rfl:     simvastatin (ZOCOR) 20 mg tablet, TAKE 1 TABLET EVERY DAY, Disp: 90 tablet, Rfl: 3    Review of Systems   Constitutional: Negative for activity change, appetite change, chills, diaphoresis, fatigue, fever and unexpected weight change  She is still remains markedly overweight  Generally feels reasonably well at this time  Stress level under control  HENT: Negative for congestion, ear pain, hearing loss, mouth sores, nosebleeds, postnasal drip, sinus pressure, sinus pain, sore throat and trouble swallowing  Eyes: Negative for pain, discharge and visual disturbance  Respiratory: Negative for apnea, cough, chest tightness, shortness of breath and wheezing  Cardiovascular: Negative for chest pain, palpitations and leg swelling  Has atrial fibrillation  Anticoagulated  Followed by cardiology  Gastrointestinal: Negative for abdominal pain, anal bleeding, blood in stool, constipation, diarrhea, nausea and vomiting  Endocrine: Negative for polydipsia and polyphagia  Has type 2 diabetes  Genitourinary: Negative for decreased urine volume, dysuria, flank pain, frequency, hematuria and urgency  Musculoskeletal: Negative for arthralgias, back pain, gait problem, joint swelling and myalgias  Skin: Negative for rash and wound  Allergic/Immunologic: Negative for environmental allergies and food allergies  Neurological: Negative for dizziness, tremors, seizures, syncope, speech difficulty, light-headedness, numbness and headaches  Hematological: Negative for adenopathy  Does not bruise/bleed easily  Psychiatric/Behavioral: Negative for agitation, confusion, hallucinations, sleep disturbance and suicidal ideas  The patient is not nervous/anxious            Objective:  /78 (BP Location: Left arm, Patient Position: Sitting, Cuff Size: Standard)   Pulse 84   Temp (!) 97 1 °F (36 2 °C)   Ht 5' 8" (1 727 m)   SpO2 95%   BMI 38 92 kg/m²      Physical Exam  Vitals signs and nursing note reviewed  Constitutional:       General: She is not in acute distress  Appearance: She is well-developed  Comments: Markedly overweight  Weight is 256 lb  Blood pressure is 138/78  Rhythm irregular  Rate 70  Temperature is 97 1°  O2 saturations 95  HENT:      Head: Normocephalic  Right Ear: External ear normal       Left Ear: External ear normal       Nose: Nose normal       Mouth/Throat:      Pharynx: No oropharyngeal exudate  Eyes:      General:         Right eye: No discharge  Left eye: No discharge  Conjunctiva/sclera: Conjunctivae normal       Pupils: Pupils are equal, round, and reactive to light  Neck:      Musculoskeletal: Normal range of motion and neck supple  Thyroid: No thyromegaly  Cardiovascular:      Rate and Rhythm: Normal rate  Heart sounds: Normal heart sounds  No murmur  No friction rub  No gallop  Comments: Rhythm is irregularly irregular  Pulmonary:      Effort: Pulmonary effort is normal  No respiratory distress  Breath sounds: Normal breath sounds  No wheezing or rales  Abdominal:      General: Bowel sounds are normal  There is no distension  Palpations: Abdomen is soft  There is no mass  Tenderness: There is no abdominal tenderness  There is no guarding or rebound  Comments: Abdomen is obese soft nontender with no masses  Musculoskeletal: Normal range of motion  General: No tenderness or deformity  Lymphadenopathy:      Cervical: No cervical adenopathy  Skin:     General: Skin is warm and dry  Findings: No erythema or rash  Neurological:      Mental Status: She is alert  Cranial Nerves: No cranial nerve deficit        Coordination: Coordination normal       Deep Tendon Reflexes: Reflexes are normal and symmetric  Reflexes normal    Psychiatric:         Behavior: Behavior normal          Thought Content:  Thought content normal          Judgment: Judgment normal            Recent Results (from the past 1008 hour(s))   Lipid panel    Collection Time: 09/08/20  8:32 AM   Result Value Ref Range    Cholesterol 171 50 - 200 mg/dL    Triglycerides 109 <=150 mg/dL    HDL, Direct 68 >=40 mg/dL    LDL Calculated 81 0 - 100 mg/dL    Non-HDL-Chol (CHOL-HDL) 103 mg/dl   Basic metabolic panel    Collection Time: 09/08/20  8:32 AM   Result Value Ref Range    Sodium 142 136 - 145 mmol/L    Potassium 4 7 3 5 - 5 3 mmol/L    Chloride 104 100 - 108 mmol/L    CO2 31 21 - 32 mmol/L    ANION GAP 7 4 - 13 mmol/L    BUN 12 5 - 25 mg/dL    Creatinine 0 99 0 60 - 1 30 mg/dL    Glucose, Fasting 130 (H) 65 - 99 mg/dL    Calcium 9 5 8 3 - 10 1 mg/dL    eGFR 57 ml/min/1 73sq m   CBC and differential    Collection Time: 09/08/20  8:32 AM   Result Value Ref Range    WBC 7 95 4 31 - 10 16 Thousand/uL    RBC 4 34 3 81 - 5 12 Million/uL    Hemoglobin 13 4 11 5 - 15 4 g/dL    Hematocrit 42 0 34 8 - 46 1 %    MCV 97 82 - 98 fL    MCH 30 9 26 8 - 34 3 pg    MCHC 31 9 31 4 - 37 4 g/dL    RDW 12 8 11 6 - 15 1 %    MPV 11 3 8 9 - 12 7 fL    Platelets 500 435 - 243 Thousands/uL    nRBC 0 /100 WBCs    Neutrophils Relative 62 43 - 75 %    Immat GRANS % 1 0 - 2 %    Lymphocytes Relative 21 14 - 44 %    Monocytes Relative 10 4 - 12 %    Eosinophils Relative 5 0 - 6 %    Basophils Relative 1 0 - 1 %    Neutrophils Absolute 5 06 1 85 - 7 62 Thousands/µL    Immature Grans Absolute 0 04 0 00 - 0 20 Thousand/uL    Lymphocytes Absolute 1 64 0 60 - 4 47 Thousands/µL    Monocytes Absolute 0 80 0 17 - 1 22 Thousand/µL    Eosinophils Absolute 0 37 0 00 - 0 61 Thousand/µL    Basophils Absolute 0 04 0 00 - 0 10 Thousands/µL   Protein / creatinine ratio, urine    Collection Time: 09/08/20  8:32 AM   Result Value Ref Range    Creatinine, Ur 134 0 mg/dL    Protein Urine Random 36 mg/dL    Prot/Creat Ratio, Ur 0 27 (H) 0 00 - 0 10   UA (URINE) with reflex to Scope    Collection Time: 09/08/20  8:32 AM   Result Value Ref Range    Color, UA Yellow     Clarity, UA Clear     Specific Gravity, UA 1 020 1 003 - 1 030    pH, UA 6 0 4 5, 5 0, 5 5, 6 0, 6 5, 7 0, 7 5, 8 0    Leukocytes, UA Trace (A) Negative    Nitrite, UA Negative Negative    Protein, UA Trace (A) Negative mg/dl    Glucose, UA Negative Negative mg/dl    Ketones, UA Negative Negative mg/dl    Urobilinogen, UA 0 2 0 2, 1 0 E U /dl E U /dl    Bilirubin, UA Negative Negative    Blood, UA Trace-Intact (A) Negative   Urine Microscopic    Collection Time: 09/08/20  8:32 AM   Result Value Ref Range    RBC, UA 2-4 (A) None Seen, 0-5 /hpf    WBC, UA 2-4 (A) None Seen, 0-5, 5-55, 5-65 /hpf    Epithelial Cells Moderate (A) None Seen, Occasional /hpf    Bacteria, UA Moderate (A) None Seen, Occasional /hpf    Hyaline Casts, UA 2-4 (A) (none) /lpf

## 2020-10-02 ENCOUNTER — OFFICE VISIT (OUTPATIENT)
Dept: CARDIOLOGY CLINIC | Facility: CLINIC | Age: 75
End: 2020-10-02
Payer: MEDICARE

## 2020-10-02 VITALS
HEART RATE: 88 BPM | WEIGHT: 257 LBS | DIASTOLIC BLOOD PRESSURE: 80 MMHG | OXYGEN SATURATION: 97 % | SYSTOLIC BLOOD PRESSURE: 136 MMHG | BODY MASS INDEX: 38.95 KG/M2 | HEIGHT: 68 IN

## 2020-10-02 DIAGNOSIS — I48.20 CHRONIC ATRIAL FIBRILLATION (HCC): Primary | ICD-10-CM

## 2020-10-02 DIAGNOSIS — E78.2 MIXED HYPERLIPIDEMIA: ICD-10-CM

## 2020-10-02 DIAGNOSIS — I10 BENIGN ESSENTIAL HYPERTENSION: ICD-10-CM

## 2020-10-02 DIAGNOSIS — Z79.01 CHRONIC ANTICOAGULATION: ICD-10-CM

## 2020-10-02 PROCEDURE — 99214 OFFICE O/P EST MOD 30 MIN: CPT | Performed by: INTERNAL MEDICINE

## 2021-01-06 DIAGNOSIS — I10 ESSENTIAL HYPERTENSION: ICD-10-CM

## 2021-01-06 DIAGNOSIS — F32.A ANXIETY AND DEPRESSION: ICD-10-CM

## 2021-01-06 DIAGNOSIS — F41.9 ANXIETY AND DEPRESSION: ICD-10-CM

## 2021-01-06 RX ORDER — CITALOPRAM 20 MG/1
20 TABLET ORAL DAILY
Qty: 90 TABLET | Refills: 1 | Status: SHIPPED | OUTPATIENT
Start: 2021-01-06 | End: 2021-05-17

## 2021-01-06 RX ORDER — DILTIAZEM HYDROCHLORIDE 240 MG/1
240 CAPSULE, EXTENDED RELEASE ORAL DAILY
Qty: 90 CAPSULE | Refills: 3 | Status: SHIPPED | OUTPATIENT
Start: 2021-01-06

## 2021-01-26 ENCOUNTER — TRANSCRIBE ORDERS (OUTPATIENT)
Dept: ADMINISTRATIVE | Facility: HOSPITAL | Age: 76
End: 2021-01-26

## 2021-01-26 ENCOUNTER — LAB (OUTPATIENT)
Dept: LAB | Facility: HOSPITAL | Age: 76
End: 2021-01-26
Attending: INTERNAL MEDICINE
Payer: MEDICARE

## 2021-01-26 DIAGNOSIS — R80.9 PROTEINURIA, UNSPECIFIED TYPE: ICD-10-CM

## 2021-01-26 DIAGNOSIS — I10 ESSENTIAL HYPERTENSION: ICD-10-CM

## 2021-01-26 DIAGNOSIS — E11.9 DIABETES MELLITUS WITHOUT COMPLICATION (HCC): Primary | ICD-10-CM

## 2021-01-26 DIAGNOSIS — E11.9 DIABETES MELLITUS WITHOUT COMPLICATION (HCC): ICD-10-CM

## 2021-01-26 LAB
ANION GAP SERPL CALCULATED.3IONS-SCNC: 10 MMOL/L (ref 4–13)
BASOPHILS # BLD AUTO: 0.09 THOUSANDS/ΜL (ref 0–0.1)
BASOPHILS NFR BLD AUTO: 1 % (ref 0–1)
BUN SERPL-MCNC: 23 MG/DL (ref 5–25)
CALCIUM SERPL-MCNC: 9.8 MG/DL (ref 8.3–10.1)
CHLORIDE SERPL-SCNC: 101 MMOL/L (ref 100–108)
CO2 SERPL-SCNC: 27 MMOL/L (ref 21–32)
CREAT SERPL-MCNC: 1.09 MG/DL (ref 0.6–1.3)
EOSINOPHIL # BLD AUTO: 0.3 THOUSAND/ΜL (ref 0–0.61)
EOSINOPHIL NFR BLD AUTO: 3 % (ref 0–6)
ERYTHROCYTE [DISTWIDTH] IN BLOOD BY AUTOMATED COUNT: 12.1 % (ref 11.6–15.1)
GFR SERPL CREATININE-BSD FRML MDRD: 50 ML/MIN/1.73SQ M
GLUCOSE P FAST SERPL-MCNC: 146 MG/DL (ref 65–99)
HCT VFR BLD AUTO: 44.8 % (ref 34.8–46.1)
HGB BLD-MCNC: 14.8 G/DL (ref 11.5–15.4)
IMM GRANULOCYTES # BLD AUTO: 0.05 THOUSAND/UL (ref 0–0.2)
IMM GRANULOCYTES NFR BLD AUTO: 1 % (ref 0–2)
LYMPHOCYTES # BLD AUTO: 1.93 THOUSANDS/ΜL (ref 0.6–4.47)
LYMPHOCYTES NFR BLD AUTO: 21 % (ref 14–44)
MCH RBC QN AUTO: 31.2 PG (ref 26.8–34.3)
MCHC RBC AUTO-ENTMCNC: 33 G/DL (ref 31.4–37.4)
MCV RBC AUTO: 95 FL (ref 82–98)
MONOCYTES # BLD AUTO: 0.78 THOUSAND/ΜL (ref 0.17–1.22)
MONOCYTES NFR BLD AUTO: 8 % (ref 4–12)
NEUTROPHILS # BLD AUTO: 6.16 THOUSANDS/ΜL (ref 1.85–7.62)
NEUTS SEG NFR BLD AUTO: 66 % (ref 43–75)
NRBC BLD AUTO-RTO: 0 /100 WBCS
PLATELET # BLD AUTO: 323 THOUSANDS/UL (ref 149–390)
PMV BLD AUTO: 11.2 FL (ref 8.9–12.7)
POTASSIUM SERPL-SCNC: 4.3 MMOL/L (ref 3.5–5.3)
RBC # BLD AUTO: 4.74 MILLION/UL (ref 3.81–5.12)
SODIUM SERPL-SCNC: 138 MMOL/L (ref 136–145)
WBC # BLD AUTO: 9.31 THOUSAND/UL (ref 4.31–10.16)

## 2021-01-26 PROCEDURE — 80048 BASIC METABOLIC PNL TOTAL CA: CPT

## 2021-01-26 PROCEDURE — 36415 COLL VENOUS BLD VENIPUNCTURE: CPT

## 2021-01-26 PROCEDURE — 85025 COMPLETE CBC W/AUTO DIFF WBC: CPT

## 2021-01-26 RX ORDER — IRBESARTAN 150 MG/1
150 TABLET ORAL DAILY
Qty: 90 TABLET | Refills: 3 | Status: SHIPPED | OUTPATIENT
Start: 2021-01-26 | End: 2021-10-25

## 2021-01-28 ENCOUNTER — LAB (OUTPATIENT)
Dept: LAB | Facility: HOSPITAL | Age: 76
End: 2021-01-28
Attending: INTERNAL MEDICINE
Payer: MEDICARE

## 2021-01-28 LAB
BACTERIA UR QL AUTO: NORMAL /HPF
BILIRUB UR QL STRIP: NEGATIVE
CLARITY UR: CLEAR
COLOR UR: YELLOW
CREAT UR-MCNC: 113 MG/DL
GLUCOSE UR STRIP-MCNC: NEGATIVE MG/DL
HGB UR QL STRIP.AUTO: ABNORMAL
KETONES UR STRIP-MCNC: NEGATIVE MG/DL
LEUKOCYTE ESTERASE UR QL STRIP: NEGATIVE
NITRITE UR QL STRIP: NEGATIVE
NON-SQ EPI CELLS URNS QL MICRO: NORMAL /HPF
PH UR STRIP.AUTO: 5.5 [PH]
PROT UR STRIP-MCNC: NEGATIVE MG/DL
PROT UR-MCNC: 10 MG/DL
PROT/CREAT UR: 0.09 MG/G{CREAT} (ref 0–0.1)
RBC #/AREA URNS AUTO: NORMAL /HPF
SP GR UR STRIP.AUTO: 1.02 (ref 1–1.03)
UROBILINOGEN UR QL STRIP.AUTO: 0.2 E.U./DL
WBC #/AREA URNS AUTO: NORMAL /HPF

## 2021-01-28 PROCEDURE — 81001 URINALYSIS AUTO W/SCOPE: CPT | Performed by: INTERNAL MEDICINE

## 2021-01-28 PROCEDURE — 84156 ASSAY OF PROTEIN URINE: CPT | Performed by: INTERNAL MEDICINE

## 2021-01-28 PROCEDURE — 82570 ASSAY OF URINE CREATININE: CPT | Performed by: INTERNAL MEDICINE

## 2021-02-15 ENCOUNTER — OFFICE VISIT (OUTPATIENT)
Dept: INTERNAL MEDICINE CLINIC | Facility: CLINIC | Age: 76
End: 2021-02-15
Payer: MEDICARE

## 2021-02-15 VITALS
BODY MASS INDEX: 39.86 KG/M2 | HEART RATE: 86 BPM | DIASTOLIC BLOOD PRESSURE: 78 MMHG | OXYGEN SATURATION: 98 % | SYSTOLIC BLOOD PRESSURE: 138 MMHG | HEIGHT: 68 IN | WEIGHT: 263 LBS | TEMPERATURE: 97.2 F

## 2021-02-15 DIAGNOSIS — E11.8 TYPE 2 DIABETES MELLITUS WITH COMPLICATION, WITHOUT LONG-TERM CURRENT USE OF INSULIN (HCC): Primary | ICD-10-CM

## 2021-02-15 DIAGNOSIS — F32.A DEPRESSION, UNSPECIFIED DEPRESSION TYPE: ICD-10-CM

## 2021-02-15 DIAGNOSIS — E66.9 OBESITY (BMI 30-39.9): ICD-10-CM

## 2021-02-15 DIAGNOSIS — Z00.00 MEDICARE ANNUAL WELLNESS VISIT, INITIAL: ICD-10-CM

## 2021-02-15 DIAGNOSIS — I10 BENIGN ESSENTIAL HYPERTENSION: ICD-10-CM

## 2021-02-15 DIAGNOSIS — E78.2 MIXED HYPERLIPIDEMIA: ICD-10-CM

## 2021-02-15 DIAGNOSIS — I48.0 PAROXYSMAL A-FIB (HCC): ICD-10-CM

## 2021-02-15 DIAGNOSIS — C50.911 MALIGNANT NEOPLASM OF RIGHT FEMALE BREAST, UNSPECIFIED ESTROGEN RECEPTOR STATUS, UNSPECIFIED SITE OF BREAST (HCC): ICD-10-CM

## 2021-02-15 PROCEDURE — 99214 OFFICE O/P EST MOD 30 MIN: CPT | Performed by: INTERNAL MEDICINE

## 2021-02-15 PROCEDURE — 1123F ACP DISCUSS/DSCN MKR DOCD: CPT | Performed by: INTERNAL MEDICINE

## 2021-02-15 PROCEDURE — G0439 PPPS, SUBSEQ VISIT: HCPCS | Performed by: INTERNAL MEDICINE

## 2021-02-15 NOTE — PROGRESS NOTES
Assessment and Plan:     Problem List Items Addressed This Visit        Endocrine    Type 2 diabetes mellitus with complication, without long-term current use of insulin ([facility] [address] ) - Primary    Relevant Orders    CBC and differential    Comprehensive metabolic panel    Lipid panel    TSH, 3rd generation    Hemoglobin A1C    Microalbumin / creatinine urine ratio       Cardiovascular and Mediastinum    Benign essential hypertension    Paroxysmal A-fib (HCC)       Other    Breast CA (HCC)    Depression    Hyperlipidemia    Obesity (BMI 30-39  9)      Other Visit Diagnoses     Medicare annual wellness visit, initial               Preventive health issues were discussed with patient, and age appropriate screening tests were ordered as noted in patient's After Visit Summary  Personalized health advice and appropriate referrals for health education or preventive services given if needed, as noted in patient's After Visit Summary  History of Present Illness:     Patient presents for Medicare Annual Wellness visit    Patient Care Team:  Jensen Marks MD as PCP - General (Internal Medicine)  Johny Mcmullen, Eleno Peabody, MD Dufm Hayward, MD Smitty Sorenson, MD Balinda Bailiff, MD     Problem List:     Patient Active Problem List   Diagnosis    Benign essential hypertension    Breast CA ([facility] [address] )    Depression    Hyperlipidemia    Paroxysmal A-fib ([facility] [address] )    Proteinuria    Chronic anticoagulation    Type 2 diabetes mellitus without complication ([facility] [address] )    Type 2 diabetes mellitus with complication, without long-term current use of insulin (HCC)    Obesity (BMI 30-39  9)      Past Medical and Surgical History:     Past Medical History:   Diagnosis Date    Atrial fibrillation ([facility] [address] )     Chronic kidney disease     Diabetes mellitus ([facility] [address] )     Elevation of level of transaminase and lactic acid dehydrogenase (LDH)     Hypercholesterolemia     Last assessed: 6/11/14    Hypercholesterolemia     Malignant neoplasm of female breast ([facility] [address] )     Pulmonary edema     [facility]     Splenic disorder     Systemic inflammatory response syndrome (SIRS) due to infectious process      Past Surgical History:   Procedure Laterality Date    BREAST RECONSTRUCTION Bilateral     w/implant prosthesis    MASTECTOMY      REPLACEMENT TOTAL KNEE Right     SPLENECTOMY      TONSILLECTOMY        Family History:     Family History   Problem Relation Age of Onset    Diabetes Mother         Mellitus    Kidney disease Mother     Breast cancer Family       Social History:     E-Cigarette/Vaping    E-Cigarette Use Never User      E-Cigarette/Vaping Substances    Nicotine No     THC No     CBD No     Flavoring No     Other No     Unknown No      Social History     Socioeconomic History    Marital status:       Spouse name: None    Number of children: None    Years of education: None    Highest education level: None   Occupational History    Occupation: Retired   Social Needs    Financial resource strain: None    Food insecurity     Worry: None     Inability: None    Transportation needs     Medical: None     Non-medical: None   Tobacco Use    Smoking status: Never Smoker    Smokeless tobacco: Never Used   Substance and Sexual Activity    Alcohol use: No    Drug use: No    Sexual activity: Not Currently     Partners: Male   Lifestyle    Physical activity     Days per week: 0 days     Minutes per session: 0 min    Stress: Not at all   Relationships    Social connections     Talks on phone: None     Gets together: None     Attends Protestant service: None     Active member of club or organization: None     Attends meetings of clubs or organizations: None     Relationship status: None    Intimate partner violence     Fear of current or ex partner: None     Emotionally abused: None     Physically abused: None     Forced sexual activity: None   Other Topics Concern    None   Social History Narrative    Always uses seatbelt    Living independently w/spouse      Medications and Allergies:     Current Outpatient Medications   Medication Sig Dispense Refill    Blood Glucose Monitoring Suppl (ONE TOUCH ULTRA 2) w/Device KIT Patient to test once daily 1 each 0    brimonidine-timolol (COMBIGAN) 0 2-0 5 % Administer 1 drop to both eyes daily       citalopram (CeleXA) 20 mg tablet Take 1 tablet (20 mg total) by mouth daily 90 tablet 1    diltiazem (Dilt-XR) 240 MG 24 hr capsule Take 1 capsule (240 mg total) by mouth daily 90 capsule 3    ELIQUIS 5 MG TAKE 1 TABLET TWICE DAILY 180 tablet 3    glucose blood (ONE TOUCH ULTRA TEST) test strip Patient to test once daily (Patient taking differently: 1 each by Other route 2 (two) times a day Patient to test once daily) 100 each 3    irbesartan (AVAPRO) 150 mg tablet Take 1 tablet (150 mg total) by mouth daily 90 tablet 3    Lancets (ONETOUCH ULTRASOFT) lancets Patient to test once daily 100 each 3    metFORMIN (GLUCOPHAGE) 500 mg tablet TAKE 1 TABLET EVERY MORNING AND TAKE 2 TABLETS IN THE EVENING 270 tablet 4    metoprolol tartrate (LOPRESSOR) 25 mg tablet TAKE 1 TABLET EVERY DAY 90 tablet 3    RESTASIS MULTIDOSE 0 05 % ophthalmic emulsion Administer 1 drop to both eyes daily       simvastatin (ZOCOR) 20 mg tablet TAKE 1 TABLET EVERY DAY 90 tablet 3     No current facility-administered medications for this visit        No Known Allergies   Immunizations:     Immunization History   Administered Date(s) Administered    INFLUENZA 09/24/2014, 10/17/2015, 12/27/2016, 10/20/2017, 10/04/2018    Influenza Split High Dose Preservative Free IM 12/27/2016    Influenza, seasonal, injectable 09/24/2014    Meningococcal, Unknown Serogroups 02/14/2017    Pneumococcal Conjugate 13-Valent 09/11/2020    Pneumococcal Polysaccharide PPV23 02/14/2017    Tdap 1945    influenza, trivalent, adjuvanted 10/08/2019      Health Maintenance:         Topic Date Due    Hepatitis C Screening 1945    MAMMOGRAM  02/15/2022 (Originally 1945)    DXA SCAN  11/26/2021    Colorectal Cancer Screening  09/02/2030         Topic Date Due    Meningococcal ACWY Vaccine (1 - Risk start before 7 months 4-dose series) 1945    HIB Vaccine (1 of 1 - Risk 1-dose series) 09/15/1948    DTaP,Tdap,and Td Vaccines (1 - Tdap) 06/15/1968    Influenza Vaccine (1) 09/01/2020      Medicare Health Risk Assessment:     /78 (BP Location: Left arm, Patient Position: Sitting, Cuff Size: Standard)   Pulse 86   Temp (!) 97 2 °F (36 2 °C)   Ht 5' 8" (1 727 m)   Wt 119 kg (263 lb)   SpO2 98%   BMI 39 99 kg/m²      Katty Campoverde is here for her Subsequent Wellness visit  Health Risk Assessment:   Patient rates overall health as good  Patient feels that their physical health rating is same  Eyesight was rated as same  Hearing was rated as same  Patient feels that their emotional and mental health rating is same  Pain experienced in the last 7 days has been none  Patient states that she has experienced no weight loss or gain in last 6 months  Depression Screening:   PHQ-2 Score: 0  PHQ-9 Score: 0      Fall Risk Screening: In the past year, patient has experienced: no history of falling in past year      Urinary Incontinence Screening:   Patient has leaked urine accidently in the last six months  Home Safety:  Patient does not have trouble with stairs inside or outside of their home  Patient has working smoke alarms and has working carbon monoxide detector  Home safety hazards include: none  Nutrition:   Current diet is Regular and Limited junk food  Medications:   Patient is not currently taking any over-the-counter supplements  Patient is able to manage medications  Activities of Daily Living (ADLs)/Instrumental Activities of Daily Living (IADLs):   Walk and transfer into and out of bed and chair?: Yes  Dress and groom yourself?: Yes    Bathe or shower yourself?: Yes    Feed yourself? Yes  Do your laundry/housekeeping?: Yes  Manage your money, pay your bills and track your expenses?: Yes  Make your own meals?: Yes    Do your own shopping?: Yes    Previous Hospitalizations:   Any hospitalizations or ED visits within the last 12 months?: No      Advance Care Planning:   Living will: Yes    Advanced directive: Yes      Cognitive Screening:   Provider or family/friend/caregiver concerned regarding cognition?: No    PREVENTIVE SCREENINGS      Cardiovascular Screening:    General: Screening Not Indicated and History Lipid Disorder      Diabetes Screening:     General: Screening Not Indicated and History Diabetes      Colorectal Cancer Screening:     General: Screening Current      Breast Cancer Screening:     General: History Breast Cancer      Cervical Cancer Screening:    General: Screening Not Indicated      Osteoporosis Screening:    General: Screening Current      Abdominal Aortic Aneurysm (AAA) Screening:        General: Screening Not Indicated      Lung Cancer Screening:     General: Screening Not Indicated      Hepatitis C Screening:    General: Screening Not Indicated    Other Counseling Topics:   Alcohol use counseling, car/seat belt/driving safety, skin self-exam, sunscreen and calcium and vitamin D intake and regular weightbearing exercise         Aide Pimentel MD

## 2021-02-15 NOTE — PROGRESS NOTES
INTERNAL MEDICINE OFFICE VISIT  [facility], [address] [facility]  Tel: [phone]      NAME: Jesica Calvert  AGE: 70 y o  SEX: female  : 1945   MRN: [id number]    DATE: 2/15/2021  TIME: 2:28 PM      Assessment and Plan:  1  Type 2 diabetes mellitus with complication, without long-term current use of insulin (HCC)   continue present medication, check a hemoglobin A1c level  Diabetic foot exam was done today in the office  Patient goes to the ophthalmologist every 4 months  - CBC and differential; Future  - Comprehensive metabolic panel; Future  - Lipid panel; Future  - TSH, 3rd generation; Future  - Hemoglobin A1C; Future  - Microalbumin / creatinine urine ratio    2  Benign essential hypertension   continue present medication    3  Mixed hyperlipidemia   continue simvastatin    4  Depression, unspecified depression type   continue Celexa    5  Paroxysmal A-fib (HCC)   continue Eliquis and metoprolol    6  Malignant neoplasm of right female breast, unspecified estrogen receptor status, unspecified site of breast ([facility] )   stable    7  Obesity (BMI 30-39  9)  BMI Counseling: Body mass index is 39 99 kg/m²  The BMI is above normal  Nutrition recommendations include decreasing portion sizes, encouraging healthy choices of fruits and vegetables and moderation in carbohydrate intake  Exercise recommendations include moderate physical activity 150 minutes/week  No pharmacotherapy was ordered  8  Medicare annual wellness visit, initial        - Counseling Documentation: patient was counseled regarding: diagnostic results, instructions for management, risk factor reductions, prognosis, patient and family education, risks and benefits of treatment options and importance of compliance with treatment  - Medication Side Effects: Adverse side effects of medications were reviewed with the patient/guardian today        Return for follow up visit in 6 months or earlier, if needed  Chief Complaint:  Chief Complaint   Patient presents with    Establish Care    Medicare Wellness Visit         History of Present Illness:    this is Dr Garret Mosley patient who needs to switch  Type 2 diabetes -fairly controlled but did not have blood work done recently  Hypertension -well controlled   Hyperlipidemia -takes simvastatin regularly   Depression - stable on the citalopram   Atrial fibrillation- heart rate is well controlled  Obesity- was told to try to eat healthy      Active Problem List:  Patient Active Problem List   Diagnosis    Benign essential hypertension    Breast CA ([facility] )    Depression    Hyperlipidemia    Paroxysmal A-fib ([place] [address] )    Proteinuria    Chronic anticoagulation    Type 2 diabetes mellitus without complication ([place] [address] )    Type 2 diabetes mellitus with complication, without long-term current use of insulin ([place] [address] )    Obesity (BMI 30-39  9)         Past Medical History:  Past Medical History:   Diagnosis Date    Atrial fibrillation (HCC)     Chronic kidney disease     [facility]     Elevation of level of transaminase and lactic acid dehydrogenase (LDH)     Hypercholesterolemia     Last assessed: 6/11/14    Hypercholesterolemia     Malignant neoplasm of female breast ([facility] )     Pulmonary edema     Septicemia (HCC)     Splenic disorder     Systemic inflammatory response syndrome (SIRS) due to infectious process          Past Surgical History:  Past Surgical History:   Procedure Laterality Date    BREAST RECONSTRUCTION Bilateral     w/implant prosthesis    MASTECTOMY      REPLACEMENT TOTAL KNEE Right     SPLENECTOMY      TONSILLECTOMY           Family History:  Family History   Problem Relation Age of Onset    Diabetes Mother         Mellitus    Kidney disease Mother     Breast cancer Family          Social History:  Social History     Socioeconomic History    Marital status:       Spouse name: None    Number of children: None    Years of education: None    Highest education level: None   Occupational History    Occupation: Retired   Social Needs    Financial resource strain: None    Food insecurity     Worry: None     Inability: None    Transportation needs     Medical: None     Non-medical: None   Tobacco Use    Smoking status: Never Smoker    Smokeless tobacco: Never Used   Substance and Sexual Activity    Alcohol use: No    Drug use: No    Sexual activity: Not Currently     Partners: Male   Lifestyle    Physical activity     Days per week: 0 days     Minutes per session: 0 min    Stress: Not at all   Relationships    Social connections     Talks on phone: None     Gets together: None     Attends Jainism service: None     Active member of club or organization: None     Attends meetings of clubs or organizations: None     Relationship status: None    Intimate partner violence     Fear of current or ex partner: None     Emotionally abused: None     Physically abused: None     Forced sexual activity: None   Other Topics Concern    None   Social History Narrative    Always uses seatbelt    Living independently w/spouse         Allergies:  No Known Allergies      Medications:    Current Outpatient Medications:     Blood Glucose Monitoring Suppl (ONE TOUCH ULTRA 2) w/Device KIT, Patient to test once daily, Disp: 1 each, Rfl: 0    brimonidine-timolol (COMBIGAN) 0 2-0 5 %, Administer 1 drop to both eyes daily , Disp: , Rfl:     citalopram (CeleXA) 20 mg tablet, Take 1 tablet (20 mg total) by mouth daily, Disp: 90 tablet, Rfl: 1    diltiazem (Dilt-XR) 240 MG 24 hr capsule, Take 1 capsule (240 mg total) by mouth daily, Disp: 90 capsule, Rfl: 3    ELIQUIS 5 MG, TAKE 1 TABLET TWICE DAILY, Disp: 180 tablet, Rfl: 3    glucose blood (ONE TOUCH ULTRA TEST) test strip, Patient to test once daily (Patient taking differently: 1 each by Other route 2 (two) times a day Patient to test once daily), Disp: 100 each, Rfl: 3    irbesartan (AVAPRO) 150 mg tablet, Take 1 tablet (150 mg total) by mouth daily, Disp: 90 tablet, Rfl: 3    Lancets (ONETOUCH ULTRASOFT) lancets, Patient to test once daily, Disp: 100 each, Rfl: 3    metFORMIN (GLUCOPHAGE) 500 mg tablet, TAKE 1 TABLET EVERY MORNING AND TAKE 2 TABLETS IN THE EVENING, Disp: 270 tablet, Rfl: 4    metoprolol tartrate (LOPRESSOR) 25 mg tablet, TAKE 1 TABLET EVERY DAY, Disp: 90 tablet, Rfl: 3    RESTASIS MULTIDOSE 0 05 % ophthalmic emulsion, Administer 1 drop to both eyes daily , Disp: , Rfl:     simvastatin (ZOCOR) 20 mg tablet, TAKE 1 TABLET EVERY DAY, Disp: 90 tablet, Rfl: 3      The following portions of the patient's history were reviewed and updated as appropriate: past medical history, past surgical history, family history, social history, allergies, current medications and active problem list       Review of Systems:  Constitutional: Denies fever, chills, weight gain, weight loss, fatigue  Eyes: Denies eye redness, eye discharge, double vision, change in visual acuity  ENT: Denies hearing loss, tinnitus, sneezing, nasal congestion, nasal discharge, sore throat   Respiratory: Denies cough, expectoration, hemoptysis, shortness of breath, wheezing  Cardiovascular: Denies chest pain, palpitations, lower extremity swelling, orthopnea, PND  Gastrointestinal: Denies abdominal pain, heartburn, nausea, vomiting, hematemesis, diarrhea, bloody stools  Genito-Urinary: Denies dysuria, frequency, difficulty in micturition, nocturia, incontinence  Musculoskeletal: Denies back pain, joint pain, muscle pain  Neurologic: Denies confusion, lightheadedness, syncope, headache, focal weakness, sensory changes, seizures  Endocrine: Denies polyuria, polydipsia, temperature intolerance  Allergy and Immunology: Denies hives, insect bite sensitivity  Hematological and Lymphatic: Denies bleeding problems, swollen glands   Psychological: Denies depression, suicidal ideation, anxiety, panic, mood swings  Dermatological: Denies pruritus, rash, skin lesion changes      Vitals:  Vitals:    02/15/21 1355   BP: 138/78   Pulse: 86   Temp: (!) 97 2 °F (36 2 °C)   SpO2: 98%       Body mass index is 39 99 kg/m²  Weight (last 2 days)     Date/Time   Weight    02/15/21 1355   119 (263)                Physical Examination:  General: Patient is not in acute distress  Awake, alert, responding to commands  No weight gain or loss  Head: Normocephalic  Atraumatic  Eyes: Conjunctiva and lids with no swelling, erythema or discharge  Both pupils normal sized, round and reactive to light  Sclera nonicteric  ENT: External examination of nose and ear normal  Otoscopic examination shows translucent tympanic membranes with patent canals without erythema  Oropharynx moist with no erythema, edema, exudate or lesions  Neck: Supple  JVP not raised  Trachea midline  No masses  No thyromegaly  Lungs: No signs of increased work of breathing or respiratory distress  Bilateral bronchovascular breath sounds with no crackles or rhonchi  Chest wall: No tenderness  Cardiovascular: Normal PMI  No thrills  Regular rate and rhythm  S1 and S2 normal  No murmur, rub or gallop  Gastrointestinal: Abdomen soft, nontender  No guarding or rigidity  Liver and spleen not palpable  Bowel sounds present  Neurologic: Cranial nerves II-XII intact  Cortical functions normal  Motor system - Reflexes 2+ and symmetrical  Sensations normal  Musculoskeletal: Gait normal  No joint tenderness  Integumentary: Skin normal with no rash or lesions  Lymphatic: No palpable lymph nodes in neck, axilla or groin  Extremities: No clubbing, cyanosis, edema or varicosities  Psychological: Judgement and insight normal  Mood and affect normal    Patient's shoes and socks removed  Right Foot/Ankle   Right Foot Inspection  Skin Exam: skin normal and skin intact no dry skin, no warmth, no callus, no erythema, no maceration, no abnormal color, no pre-ulcer, no ulcer and no callus Toe Exam: ROM and strength within normal limits  Sensory     Proprioception: diminished and intact   Monofilament testing: intact  Vascular  Capillary refills: < 3 seconds  The right DP pulse is 2+  The right PT pulse is 2+  Left Foot/Ankle  Left Foot Inspection  Skin Exam: skin normal and skin intactno dry skin, no warmth, no erythema, no maceration, normal color, no pre-ulcer, no ulcer and no callus                         Toe Exam: ROM and strength within normal limits                   Sensory     Proprioception: intact  Monofilament: intact  Vascular  Capillary refills: < 3 seconds  The left DP pulse is 2+  The left PT pulse is 2+  Assign Risk Category:  No deformity present; No loss of protective sensation;  No weak pulses       Risk: 0      Laboratory Results:  CBC with diff:   Lab Results   Component Value Date    WBC 9 31 01/26/2021    WBC 7 0 05/06/2015    RBC 4 74 01/26/2021    RBC 4 48 05/06/2015    HGB 14 8 01/26/2021    HGB 13 7 05/06/2015    HCT 44 8 01/26/2021    HCT 40 3 05/06/2015    MCV 95 01/26/2021    MCV 90 05/06/2015    MCH 31 2 01/26/2021    MCH 30 5 05/06/2015    RDW 12 1 01/26/2021    RDW 11 5 05/06/2015     01/26/2021     05/06/2015       CMP:  Lab Results   Component Value Date    CREATININE 1 09 01/26/2021    CREATININE 0 97 10/13/2015    BUN 23 01/26/2021    BUN 24 10/13/2015     10/13/2015    K 4 3 01/26/2021    K 4 7 10/13/2015     01/26/2021     10/13/2015    CO2 27 01/26/2021    CO2 28 10/13/2015    GLUCOSE 131 10/13/2015    PROT 7 6 05/06/2015    ALKPHOS 135 (H) 11/19/2019    ALKPHOS 120 (H) 05/06/2015    ALT 19 11/19/2019    ALT 32 05/06/2015    AST 16 11/19/2019    AST 19 05/06/2015       Lab Results   Component Value Date    HGBA1C 6 8 (H) 11/19/2019    HGBA1C 7 0 (H) 10/13/2015    PHOS 3 6 01/23/2019       No results found for: TROPONINI, CKMB, CKTOTAL    Lipid Profile:   Lab Results   Component Value Date    CHOL 179 10/13/2015    CHOL 171 05/06/2015     Lab Results   Component Value Date    HDL 68 09/08/2020    HDL 65 11/19/2019     Lab Results   Component Value Date    LDLCALC 81 09/08/2020    LDLCALC 94 11/19/2019     Lab Results   Component Value Date    TRIG 109 09/08/2020    TRIG 100 11/19/2019       Imaging Results:  DXA bone density spine hip and pelvis  Narrative: CENTRAL  DXA SCAN    CLINICAL HISTORY:   69year old post-menopausal  female with no significant past medical history  Osteoporosis screening  TECHNIQUE: Bone densitometry was performed using a Hologic Horizon C bone densitometer  Regions of interest appear properly placed  There are no obvious fractures or other confounding variables which could limit the study  Degenerative changes of the   lumbar spine and hip  This will falsely elevate the bone mineral densities in these regions  COMPARISON:  None  RESULTS:   LUMBAR SPINE:  L1-L4:  BMD 1 283 gm/cm2  T-score 2 1  Z-score 4 4    LEFT TOTAL HIP:  BMD 1 172 gm/cm2  T-score 1 9  Z-score 3 5    LEFT FEMORAL NECK:  BMD 0 792 gm/cm2  T-score -0 5  Z-score 1 4  Impression: 1  Based on the [facility] classification, this study is NORMAL  The patient is considered at low risk for fracture  2  A daily intake of calcium of at least 1200 mg and vitamin D, 800-1000 IU, as well as weight bearing and muscle strengthening exercise, fall prevention and avoidance of tobacco and excessive alcohol intake as basic preventive measures are recommended  3  Repeat DXA scan on the same equipment in 18-24 months as clinically indicated  The 10 year risk of hip fracture is 1%, with the 10 year risk of major osteoporotic fracture being 7%, as calculated by the [facility] fracture risk assessment tool (FRAX)  The current NOF guidelines recommend treating patients with FRAX 10 year risk score of   >3% for hip fracture and >20% for major osteoporotic fracture       WHO CLASSIFICATION:  Normal (a T-score of -1 0 or higher)  Low bone mineral density (a T-score of less than -1 0 but higher than -2 5)  Osteoporosis (a T-score of -2 5 or less)  Severe osteoporosis (a T-score of -2 5 or less with a fragility fracture)          Workstation performed: [id number]       Health Maintenance:  Health Maintenance   Topic Date Due    Hepatitis C Screening  1945    Medicare Annual Wellness Visit (AWV)  1945    Meningococcal ACWY Vaccine (1 - Risk start before 7 months 4-dose series) 1945    HIB Vaccine (1 of 1 - Risk 1-dose series) 09/15/1948    Depression Remission PHQ  06/15/1959    BMI: Followup Plan  06/15/1965    DTaP,Tdap,and Td Vaccines (1 - Tdap) 06/15/1968    DM Eye Exam  12/06/2017    HEMOGLOBIN A1C  05/19/2020    Influenza Vaccine (1) 09/01/2020    Diabetic Foot Exam  10/15/2020    MAMMOGRAM  02/15/2022 (Originally 1945)    Fall Risk  09/11/2021    DXA SCAN  11/26/2021    BMI: Adult  02/15/2022    Colorectal Cancer Screening  09/02/2030    Pneumococcal Vaccine: 65+ Years  Completed    Hepatitis B Vaccine  Aged Out    IPV Vaccine  Aged Out    Hepatitis A Vaccine  Aged Out    HPV Vaccine  Aged [employer] History   Administered Date(s) Administered    INFLUENZA 09/24/2014, 10/17/2015, 12/27/2016, 10/20/2017, 10/04/2018    Influenza Split High Dose Preservative Free IM 12/27/2016    Influenza, seasonal, injectable 09/24/2014    Meningococcal, Unknown Serogroups 02/14/2017    Pneumococcal Conjugate 13-Valent 09/11/2020    Pneumococcal Polysaccharide PPV23 02/14/2017    Tdap 1945    influenza, trivalent, adjuvanted 10/08/2019         Mandy Gutierrez MD  2/15/2021,2:28 PM

## 2021-03-03 DIAGNOSIS — E78.2 COMBINED HYPERLIPIDEMIA: ICD-10-CM

## 2021-03-03 RX ORDER — SIMVASTATIN 20 MG
20 TABLET ORAL DAILY
Qty: 90 TABLET | Refills: 3 | Status: SHIPPED | OUTPATIENT
Start: 2021-03-03

## 2021-03-05 DIAGNOSIS — Z23 ENCOUNTER FOR IMMUNIZATION: ICD-10-CM

## 2021-04-19 DIAGNOSIS — E11.8 TYPE 2 DIABETES MELLITUS WITH COMPLICATION, WITHOUT LONG-TERM CURRENT USE OF INSULIN (HCC): ICD-10-CM

## 2021-04-19 RX ORDER — LANCETS
EACH MISCELLANEOUS
Qty: 100 EACH | Refills: 3 | Status: SHIPPED | OUTPATIENT
Start: 2021-04-19

## 2021-04-22 DIAGNOSIS — E11.8 TYPE 2 DIABETES MELLITUS WITH COMPLICATION, WITHOUT LONG-TERM CURRENT USE OF INSULIN (HCC): ICD-10-CM

## 2021-04-22 NOTE — TELEPHONE ENCOUNTER
Brian paez # [phone]    Needs new script to be send to [facility]  glucose blood (ONE TOUCH ULTRA TEST) test strip  Patients tests 2x daily      They also sent over a DWO form on the lancets on the 4/19-and haven't heard back

## 2021-05-17 DIAGNOSIS — F41.9 ANXIETY AND DEPRESSION: ICD-10-CM

## 2021-05-17 DIAGNOSIS — F32.A ANXIETY AND DEPRESSION: ICD-10-CM

## 2021-05-17 RX ORDER — CITALOPRAM 20 MG/1
20 TABLET ORAL DAILY
Qty: 90 TABLET | Refills: 1 | Status: SHIPPED | OUTPATIENT
Start: 2021-05-17 | End: 2021-10-04

## 2021-05-26 DIAGNOSIS — I10 HYPERTENSION, ESSENTIAL: ICD-10-CM

## 2021-06-30 DIAGNOSIS — I48.20 CHRONIC ATRIAL FIBRILLATION (HCC): ICD-10-CM

## 2021-07-01 RX ORDER — APIXABAN 5 MG/1
TABLET, FILM COATED ORAL
Qty: 180 TABLET | Refills: 3 | Status: SHIPPED | OUTPATIENT
Start: 2021-07-01 | End: 2022-06-02

## 2021-08-10 ENCOUNTER — RA CDI HCC (OUTPATIENT)
Dept: OTHER | Facility: HOSPITAL | Age: 76
End: 2021-08-10

## 2021-08-10 NOTE — PROGRESS NOTES
[facility] [employer]  coding opportunities             Chart Reviewed * (Number of) Inbasket suggestions sent to Provider: 1               Number of suggestions NOT actually used: 1     Patients insurance company: Medicare     Visit status: Patient arrived for their scheduled appointment     Provider never responded to [facility] [employer]  coding request     [facility] [employer]  coding opportunities        DX: E66 [id number] Morbid Obesity--BMI 39       Chart reviewed, (number of) suggestions sent to provider: 1                  Patients insurance company: [facility]

## 2021-08-12 ENCOUNTER — APPOINTMENT (OUTPATIENT)
Dept: LAB | Facility: HOSPITAL | Age: 76
End: 2021-08-12
Attending: INTERNAL MEDICINE
Payer: MEDICARE

## 2021-08-12 DIAGNOSIS — E11.9 TYPE 2 DIABETES MELLITUS WITHOUT COMPLICATION, WITHOUT LONG-TERM CURRENT USE OF INSULIN (HCC): ICD-10-CM

## 2021-08-12 DIAGNOSIS — E11.8 TYPE 2 DIABETES MELLITUS WITH COMPLICATION, WITHOUT LONG-TERM CURRENT USE OF INSULIN (HCC): ICD-10-CM

## 2021-08-12 DIAGNOSIS — Z11.59 ENCOUNTER FOR HEPATITIS C SCREENING TEST FOR LOW RISK PATIENT: ICD-10-CM

## 2021-08-12 LAB
ALBUMIN SERPL BCP-MCNC: 3.3 G/DL (ref 3.5–5)
ALP SERPL-CCNC: 125 U/L (ref 46–116)
ALT SERPL W P-5'-P-CCNC: 26 U/L (ref 12–78)
ANION GAP SERPL CALCULATED.3IONS-SCNC: 11 MMOL/L (ref 4–13)
AST SERPL W P-5'-P-CCNC: 21 U/L (ref 5–45)
BACTERIA UR QL AUTO: ABNORMAL /HPF
BASOPHILS # BLD AUTO: 0.08 THOUSANDS/ΜL (ref 0–0.1)
BASOPHILS NFR BLD AUTO: 1 % (ref 0–1)
BILIRUB SERPL-MCNC: 0.57 MG/DL (ref 0.2–1)
BILIRUB UR QL STRIP: NEGATIVE
BUN SERPL-MCNC: 17 MG/DL (ref 5–25)
CALCIUM ALBUM COR SERPL-MCNC: 9.6 MG/DL (ref 8.3–10.1)
CALCIUM SERPL-MCNC: 9 MG/DL (ref 8.3–10.1)
CHLORIDE SERPL-SCNC: 104 MMOL/L (ref 100–108)
CHOLEST SERPL-MCNC: 189 MG/DL (ref 50–200)
CLARITY UR: ABNORMAL
CO2 SERPL-SCNC: 24 MMOL/L (ref 21–32)
COLOR UR: YELLOW
CREAT SERPL-MCNC: 0.96 MG/DL (ref 0.6–1.3)
CREAT UR-MCNC: 112 MG/DL
EOSINOPHIL # BLD AUTO: 0.29 THOUSAND/ΜL (ref 0–0.61)
EOSINOPHIL NFR BLD AUTO: 3 % (ref 0–6)
ERYTHROCYTE [DISTWIDTH] IN BLOOD BY AUTOMATED COUNT: 12.7 % (ref 11.6–15.1)
GFR SERPL CREATININE-BSD FRML MDRD: 58 ML/MIN/1.73SQ M
GLUCOSE P FAST SERPL-MCNC: 120 MG/DL (ref 65–99)
GLUCOSE UR STRIP-MCNC: NEGATIVE MG/DL
HCT VFR BLD AUTO: 41.3 % (ref 34.8–46.1)
HDLC SERPL-MCNC: 68 MG/DL
HGB BLD-MCNC: 13.3 G/DL (ref 11.5–15.4)
HGB UR QL STRIP.AUTO: ABNORMAL
IMM GRANULOCYTES # BLD AUTO: 0.03 THOUSAND/UL (ref 0–0.2)
IMM GRANULOCYTES NFR BLD AUTO: 0 % (ref 0–2)
KETONES UR STRIP-MCNC: NEGATIVE MG/DL
LDLC SERPL CALC-MCNC: 104 MG/DL (ref 0–100)
LEUKOCYTE ESTERASE UR QL STRIP: ABNORMAL
LYMPHOCYTES # BLD AUTO: 1.49 THOUSANDS/ΜL (ref 0.6–4.47)
LYMPHOCYTES NFR BLD AUTO: 16 % (ref 14–44)
MCH RBC QN AUTO: 30.8 PG (ref 26.8–34.3)
MCHC RBC AUTO-ENTMCNC: 32.2 G/DL (ref 31.4–37.4)
MCV RBC AUTO: 96 FL (ref 82–98)
MICROALBUMIN UR-MCNC: 81.1 MG/L (ref 0–20)
MICROALBUMIN/CREAT 24H UR: 72 MG/G CREATININE (ref 0–30)
MONOCYTES # BLD AUTO: 0.75 THOUSAND/ΜL (ref 0.17–1.22)
MONOCYTES NFR BLD AUTO: 8 % (ref 4–12)
NEUTROPHILS # BLD AUTO: 6.99 THOUSANDS/ΜL (ref 1.85–7.62)
NEUTS SEG NFR BLD AUTO: 72 % (ref 43–75)
NITRITE UR QL STRIP: NEGATIVE
NON-SQ EPI CELLS URNS QL MICRO: ABNORMAL /HPF
NONHDLC SERPL-MCNC: 121 MG/DL
NRBC BLD AUTO-RTO: 0 /100 WBCS
PH UR STRIP.AUTO: 6 [PH]
PLATELET # BLD AUTO: 279 THOUSANDS/UL (ref 149–390)
PMV BLD AUTO: 10.9 FL (ref 8.9–12.7)
POTASSIUM SERPL-SCNC: 4.4 MMOL/L (ref 3.5–5.3)
PROT SERPL-MCNC: 7.9 G/DL (ref 6.4–8.2)
PROT UR STRIP-MCNC: NEGATIVE MG/DL
RBC # BLD AUTO: 4.32 MILLION/UL (ref 3.81–5.12)
RBC #/AREA URNS AUTO: ABNORMAL /HPF
SODIUM SERPL-SCNC: 139 MMOL/L (ref 136–145)
SP GR UR STRIP.AUTO: 1.01 (ref 1–1.03)
TRIGL SERPL-MCNC: 84 MG/DL
TSH SERPL DL<=0.05 MIU/L-ACNC: 1.03 UIU/ML (ref 0.36–3.74)
UROBILINOGEN UR QL STRIP.AUTO: 0.2 E.U./DL
WBC # BLD AUTO: 9.63 THOUSAND/UL (ref 4.31–10.16)
WBC #/AREA URNS AUTO: ABNORMAL /HPF

## 2021-08-12 PROCEDURE — 85025 COMPLETE CBC W/AUTO DIFF WBC: CPT

## 2021-08-12 PROCEDURE — 80061 LIPID PANEL: CPT

## 2021-08-12 PROCEDURE — 86803 HEPATITIS C AB TEST: CPT

## 2021-08-12 PROCEDURE — 84443 ASSAY THYROID STIM HORMONE: CPT

## 2021-08-12 PROCEDURE — 81001 URINALYSIS AUTO W/SCOPE: CPT | Performed by: INTERNAL MEDICINE

## 2021-08-12 PROCEDURE — 83036 HEMOGLOBIN GLYCOSYLATED A1C: CPT

## 2021-08-12 PROCEDURE — 80053 COMPREHEN METABOLIC PANEL: CPT

## 2021-08-12 PROCEDURE — 82043 UR ALBUMIN QUANTITATIVE: CPT | Performed by: INTERNAL MEDICINE

## 2021-08-12 PROCEDURE — 36415 COLL VENOUS BLD VENIPUNCTURE: CPT

## 2021-08-12 PROCEDURE — 82570 ASSAY OF URINE CREATININE: CPT | Performed by: INTERNAL MEDICINE

## 2021-08-13 LAB
EST. AVERAGE GLUCOSE BLD GHB EST-MCNC: 146 MG/DL
HBA1C MFR BLD: 6.7 %
HCV AB SER QL: NORMAL

## 2021-08-16 ENCOUNTER — OFFICE VISIT (OUTPATIENT)
Dept: INTERNAL MEDICINE CLINIC | Facility: CLINIC | Age: 76
End: 2021-08-16
Payer: MEDICARE

## 2021-08-16 VITALS
DIASTOLIC BLOOD PRESSURE: 78 MMHG | HEART RATE: 93 BPM | RESPIRATION RATE: 16 BRPM | HEIGHT: 68 IN | SYSTOLIC BLOOD PRESSURE: 126 MMHG | OXYGEN SATURATION: 95 % | WEIGHT: 259 LBS | BODY MASS INDEX: 39.25 KG/M2 | TEMPERATURE: 97.5 F

## 2021-08-16 DIAGNOSIS — F32.A DEPRESSION, UNSPECIFIED DEPRESSION TYPE: ICD-10-CM

## 2021-08-16 DIAGNOSIS — I10 BENIGN ESSENTIAL HYPERTENSION: ICD-10-CM

## 2021-08-16 DIAGNOSIS — E11.8 TYPE 2 DIABETES MELLITUS WITH COMPLICATION, WITHOUT LONG-TERM CURRENT USE OF INSULIN (HCC): Primary | ICD-10-CM

## 2021-08-16 DIAGNOSIS — I48.0 PAROXYSMAL A-FIB (HCC): ICD-10-CM

## 2021-08-16 DIAGNOSIS — E78.2 MIXED HYPERLIPIDEMIA: ICD-10-CM

## 2021-08-16 PROCEDURE — 99214 OFFICE O/P EST MOD 30 MIN: CPT | Performed by: INTERNAL MEDICINE

## 2021-08-16 NOTE — PROGRESS NOTES
INTERNAL MEDICINE OFFICE VISIT  [facility]  [facility], [address]  Tel: [phone]      NAME: Ben Tee  AGE: 78 y o  SEX: female  : 1945   MRN: [id number]    DATE: 2021  TIME: 5:39 PM      Assessment and Plan:  1  Type 2 diabetes mellitus with complication, without long-term current use of insulin (HCC)    Continue metformin  - CBC and differential; Future  - Comprehensive metabolic panel; Future  - Lipid panel; Future  - TSH, 3rd generation; Future  - Hemoglobin A1C; Future    2  Benign essential hypertension   continue medications    3  Mixed hyperlipidemia   continue simvastatin    4  Paroxysmal A-fib (HCC)   continue Eliquis    5  Depression, unspecified depression type   continue Celexa      - Counseling Documentation: patient was counseled regarding: diagnostic results, instructions for management, risk factor reductions, prognosis, patient and family education, risks and benefits of treatment options and importance of compliance with treatment  - Medication Side Effects: Adverse side effects of medications were reviewed with the patient/guardian today  Return for follow up visit in  4 months or earlier, if needed  Chief Complaint:  Chief Complaint   Patient presents with    Follow-up      6 months / labs done         History of Present Illness:    type 2 diabetes  Is well controlled with hemoglobin A1c of 6 7  Blood pressure is stable   Lipid profile is stable on simvastatin   Depression is stable on Celexa      Active Problem List:  Patient Active Problem List   Diagnosis    Benign essential hypertension    Breast CA ([facility] )    Depression    Hyperlipidemia    Paroxysmal A-fib (HCC)    Proteinuria    Chronic anticoagulation    Type 2 diabetes mellitus without complication (HCC)    Type 2 diabetes mellitus with complication, without long-term current use of insulin (HCC)    Obesity (BMI 30-39  9) Past Medical History:  Past Medical History:   Diagnosis Date    Atrial fibrillation ([facility] )     Chronic kidney disease     Diabetes mellitus ([facility] )     Elevation of level of transaminase and lactic acid dehydrogenase (LDH)     Hypercholesterolemia     Last assessed: 6/11/14    Hypercholesterolemia     Malignant neoplasm of female breast ([facility] )     Pulmonary edema     Septicemia (HCC)     Splenic disorder     Systemic inflammatory response syndrome (SIRS) due to infectious process          Past Surgical History:  Past Surgical History:   Procedure Laterality Date    BREAST RECONSTRUCTION Bilateral     w/implant prosthesis    MASTECTOMY      REPLACEMENT TOTAL KNEE Right     SPLENECTOMY      TONSILLECTOMY           Family History:  Family History   Problem Relation Age of Onset    Diabetes Mother         Mellitus    Kidney disease Mother     Breast cancer Family          Social History:  Social History     Socioeconomic History    Marital status:      Spouse name: None    Number of children: None    Years of education: None    Highest education level: None   Occupational History    Occupation: Retired   Tobacco Use    Smoking status: Never Smoker    Smokeless tobacco: Never Used   Vaping Use    Vaping Use: Never used   Substance and Sexual Activity    Alcohol use: No    Drug use: No    Sexual activity: Not Currently     Partners: Male   Other Topics Concern    None   Social History Narrative    Always uses seatbelt    Living independently w/spouse     Social Determinants of Health     Financial Resource Strain:     Difficulty of Paying Living Expenses:    Food Insecurity:     Worried About Running Out of Food in the Last Year:     [address] in the Last Year:    Transportation Needs:     Lack of Transportation (Medical):      Lack of Transportation (Non-Medical):    Physical Activity: Inactive    Days of Exercise per Week: 0 days    Minutes of Exercise per Session: 0 min   Stress: No Stress Concern Present    Feeling of Stress : Not at all   Social Connections:     Frequency of Communication with Friends and Family:     Frequency of Social Gatherings with Friends and Family:     Attends [employer] Services:     Active Member of Clubs or Organizations:     Attends Club or Organization Meetings:     Marital Status:    Intimate Partner Violence:     Fear of Current or Ex-Partner:     Emotionally Abused:     Physically Abused:     Sexually Abused:           Allergies:  No Known Allergies      Medications:    Current Outpatient Medications:     Blood Glucose Monitoring Suppl (ONE TOUCH ULTRA 2) w/Device KIT, Patient to test once daily, Disp: 1 each, Rfl: 0    brimonidine-timolol (COMBIGAN) 0 2-0 5 %, Administer 1 drop to both eyes daily , Disp: , Rfl:     citalopram (CeleXA) 20 mg tablet, TAKE 1 TABLET (20 MG TOTAL) BY MOUTH DAILY, Disp: 90 tablet, Rfl: 1    diltiazem (Dilt-XR) 240 MG 24 hr capsule, Take 1 capsule (240 mg total) by mouth daily, Disp: 90 capsule, Rfl: 3    Eliquis 5 MG, TAKE 1 TABLET TWICE DAILY, Disp: 180 tablet, Rfl: 3    glucose blood (ONE TOUCH ULTRA TEST) test strip, Patient to test twice daily, Disp: 100 each, Rfl: 3    irbesartan (AVAPRO) 150 mg tablet, Take 1 tablet (150 mg total) by mouth daily, Disp: 90 tablet, Rfl: 3    Lancets (onetouch ultrasoft) lancets, Patient to test once daily, Disp: 100 each, Rfl: 3    metFORMIN (GLUCOPHAGE) 500 mg tablet, TAKE 1 TABLET EVERY MORNING AND TAKE 2 TABLETS IN THE EVENING, Disp: 270 tablet, Rfl: 4    metoprolol tartrate (LOPRESSOR) 25 mg tablet, TAKE 1 TABLET EVERY DAY, Disp: 90 tablet, Rfl: 3    RESTASIS MULTIDOSE 0 05 % ophthalmic emulsion, Administer 1 drop to both eyes daily , Disp: , Rfl:     simvastatin (ZOCOR) 20 mg tablet, Take 1 tablet (20 mg total) by mouth daily, Disp: 90 tablet, Rfl: 3      The following portions of the patient's history were reviewed and updated as appropriate: past medical history, past surgical history, family history, social history, allergies, current medications and active problem list       Review of Systems:  Constitutional: Denies fever, chills, weight gain, weight loss, fatigue  Eyes: Denies eye redness, eye discharge, double vision, change in visual acuity  ENT: Denies hearing loss, tinnitus, sneezing, nasal congestion, nasal discharge, sore throat   Respiratory: Denies cough, expectoration, hemoptysis, shortness of breath, wheezing  Cardiovascular: Denies chest pain, palpitations, lower extremity swelling, orthopnea, PND  Gastrointestinal: Denies abdominal pain, heartburn, nausea, vomiting, hematemesis, diarrhea, bloody stools  Genito-Urinary: Denies dysuria, frequency, difficulty in micturition, nocturia, incontinence  Musculoskeletal: Denies back pain, joint pain, muscle pain  Neurologic: Denies confusion, lightheadedness, syncope, headache, focal weakness, sensory changes, seizures  Endocrine: Denies polyuria, polydipsia, temperature intolerance  Allergy and Immunology: Denies hives, insect bite sensitivity  Hematological and Lymphatic: Denies bleeding problems, swollen glands   Psychological: Denies depression, suicidal ideation, anxiety, panic, mood swings  Dermatological: Denies pruritus, rash, skin lesion changes      Vitals:  Vitals:    08/16/21 1328   BP: 126/78   Pulse: 93   Resp: 16   Temp: 97 5 °F (36 4 °C)   SpO2: 95%       Body mass index is 39 38 kg/m²  Weight (last 2 days)     Date/Time   Weight    08/16/21 1328   117 (259)                Physical Examination:  General: Patient is not in acute distress  Awake, alert, responding to commands  No weight gain or loss  Head: Normocephalic  Atraumatic  Eyes: Conjunctiva and lids with no swelling, erythema or discharge  Both pupils normal sized, round and reactive to light   Sclera nonicteric  ENT: External examination of nose and ear normal  Otoscopic examination shows translucent tympanic membranes with patent canals without erythema  Oropharynx moist with no erythema, edema, exudate or lesions  Neck: Supple  JVP not raised  Trachea midline  No masses  No thyromegaly  Lungs: No signs of increased work of breathing or respiratory distress  Bilateral bronchovascular breath sounds with no crackles or rhonchi  Chest wall: No tenderness  Cardiovascular: Normal PMI  No thrills  Regular rate and rhythm  S1 and S2 normal  No murmur, rub or gallop  Gastrointestinal: Abdomen soft, nontender  No guarding or rigidity  Liver and spleen not palpable  Bowel sounds present  Neurologic: Cranial nerves II-XII intact   Cortical functions normal  Motor system - Reflexes 2+ and symmetrical  Sensations normal  Musculoskeletal: Gait normal  No joint tenderness  Integumentary: Skin normal with no rash or lesions  Lymphatic: No palpable lymph nodes in neck, axilla or groin  Extremities: No clubbing, cyanosis, edema or varicosities  Psychological: Judgement and insight normal  Mood and affect normal      Laboratory Results:  CBC with diff:   Lab Results   Component Value Date    WBC 9 63 08/12/2021    WBC 7 0 05/06/2015    RBC 4 32 08/12/2021    RBC 4 48 05/06/2015    HGB 13 3 08/12/2021    HGB 13 7 05/06/2015    HCT 41 3 08/12/2021    HCT 40 3 05/06/2015    MCV 96 08/12/2021    MCV 90 05/06/2015    MCH 30 8 08/12/2021    MCH 30 5 05/06/2015    RDW 12 7 08/12/2021    RDW 11 5 05/06/2015     08/12/2021     05/06/2015       CMP:  Lab Results   Component Value Date    CREATININE 0 96 08/12/2021    CREATININE 0 97 10/13/2015    BUN 17 08/12/2021    BUN 24 10/13/2015     10/13/2015    K 4 4 08/12/2021    K 4 7 10/13/2015     08/12/2021     10/13/2015    CO2 24 08/12/2021    CO2 28 10/13/2015    GLUCOSE 131 10/13/2015    PROT 7 6 05/06/2015    ALKPHOS 125 (H) 08/12/2021    ALKPHOS 120 (H) 05/06/2015    ALT 26 08/12/2021    ALT 32 05/06/2015    AST 21 08/12/2021    AST 19 05/06/2015       Lab Results   Component Value Date    HGBA1C 6 7 (H) 08/12/2021    HGBA1C 7 0 (H) 10/13/2015    PHOS 3 6 01/23/2019       No results found for: TROPONINI, CKMB, CKTOTAL    Lipid Profile:   Lab Results   Component Value Date    CHOL 179 10/13/2015    CHOL 171 05/06/2015     Lab Results   Component Value Date    HDL 68 08/12/2021    HDL 68 09/08/2020     Lab Results   Component Value Date    LDLCALC 104 (H) 08/12/2021    LDLCALC 81 09/08/2020     Lab Results   Component Value Date    TRIG 84 08/12/2021    TRIG 109 09/08/2020       Imaging Results:  DXA bone density spine hip and pelvis  Narrative: CENTRAL  DXA SCAN    CLINICAL HISTORY:   69year old post-menopausal  female with no significant past medical history  Osteoporosis screening  TECHNIQUE: Bone densitometry was performed using a Hologic Horizon C bone densitometer  Regions of interest appear properly placed  There are no obvious fractures or other confounding variables which could limit the study  Degenerative changes of the   lumbar spine and hip  This will falsely elevate the bone mineral densities in these regions  COMPARISON:  None  RESULTS:   LUMBAR SPINE:  L1-L4:  BMD 1 283 gm/cm2  T-score 2 1  Z-score 4 4    LEFT TOTAL HIP:  BMD 1 172 gm/cm2  T-score 1 9  Z-score 3 5    LEFT FEMORAL NECK:  BMD 0 792 gm/cm2  T-score -0 5  Z-score 1 4  Impression: 1  Based on the [facility] classification, this study is NORMAL  The patient is considered at low risk for fracture  2  A daily intake of calcium of at least 1200 mg and vitamin D, 800-1000 IU, as well as weight bearing and muscle strengthening exercise, fall prevention and avoidance of tobacco and excessive alcohol intake as basic preventive measures are recommended  3  Repeat DXA scan on the same equipment in 18-24 months as clinically indicated      The 10 year risk of hip fracture is 1%, with the 10 year risk of major osteoporotic fracture being 7%, as calculated by the [facility] fracture risk assessment tool (FRAX)  The current NOF guidelines recommend treating patients with FRAX 10 year risk score of   >3% for hip fracture and >20% for major osteoporotic fracture       WHO CLASSIFICATION:  Normal (a T-score of -1 0 or higher)  Low bone mineral density (a T-score of less than -1 0 but higher than -2 5)  Osteoporosis (a T-score of -2 5 or less)  Severe osteoporosis (a T-score of -2 5 or less with a fragility fracture)          Workstation performed: [id number]       Health Maintenance:  Health Maintenance   Topic Date Due    Meningococcal ACWY Vaccine (1 - Risk start before 7 months 4-dose series) Never done    HIB Vaccine (1 of 1 - Risk 1-dose series) Never done    DTaP,Tdap,and Td Vaccines (1 - Tdap) 06/15/1968    DM Eye Exam  12/06/2017    Influenza Vaccine (1) 09/01/2021    BMI: Followup Plan  02/15/2022    Breast Cancer Screening: Mammogram  02/15/2022 (Originally 6/15/1987)    DXA SCAN  11/26/2021    HEMOGLOBIN A1C  02/12/2022    Fall Risk  02/15/2022    Medicare Annual Wellness Visit (AWV)  02/15/2022    Depression Remission PHQ  02/15/2022    Diabetic Foot Exam  02/15/2022    BMI: Adult  08/16/2022    Colorectal Cancer Screening  09/02/2030    Hepatitis C Screening  Completed    Pneumococcal Vaccine: 65+ Years  Completed    COVID-19 Vaccine  Completed    Hepatitis B Vaccine  Aged Out    IPV Vaccine  Aged Out    Hepatitis A Vaccine  Aged Out    HPV Vaccine  Aged [employer] History   Administered Date(s) Administered    INFLUENZA 09/24/2014, 10/17/2015, 12/27/2016, 10/20/2017, 10/04/2018    Influenza Split High Dose Preservative Free IM 12/27/2016    Influenza, seasonal, injectable 09/24/2014    Meningococcal, Unknown Serogroups 02/14/2017    Pneumococcal Conjugate 13-Valent 09/11/2020    Pneumococcal Polysaccharide PPV23 02/14/2017    SARS-CoV-2 / COVID-19 mRNA IM (Moderna) 03/17/2021, 04/15/2021    Tdap 1945    influenza, trivalent, adjuvanted 10/08/2019 Elvi Leigh MD  8/16/2021,5:39 PM

## 2021-09-15 DIAGNOSIS — E11.9 TYPE 2 DIABETES MELLITUS WITHOUT COMPLICATION, WITHOUT LONG-TERM CURRENT USE OF INSULIN (HCC): ICD-10-CM

## 2021-10-04 DIAGNOSIS — F32.A ANXIETY AND DEPRESSION: ICD-10-CM

## 2021-10-04 DIAGNOSIS — F41.9 ANXIETY AND DEPRESSION: ICD-10-CM

## 2021-10-04 RX ORDER — CITALOPRAM 20 MG/1
TABLET ORAL
Qty: 90 TABLET | Refills: 1 | Status: SHIPPED | OUTPATIENT
Start: 2021-10-04 | End: 2022-03-19

## 2021-10-12 ENCOUNTER — OFFICE VISIT (OUTPATIENT)
Dept: CARDIOLOGY CLINIC | Facility: CLINIC | Age: 76
End: 2021-10-12
Payer: MEDICARE

## 2021-10-12 VITALS
WEIGHT: 252.3 LBS | HEIGHT: 68 IN | DIASTOLIC BLOOD PRESSURE: 84 MMHG | OXYGEN SATURATION: 96 % | BODY MASS INDEX: 38.24 KG/M2 | SYSTOLIC BLOOD PRESSURE: 130 MMHG | HEART RATE: 105 BPM

## 2021-10-12 DIAGNOSIS — Z79.01 CHRONIC ANTICOAGULATION: ICD-10-CM

## 2021-10-12 DIAGNOSIS — E78.2 MIXED HYPERLIPIDEMIA: ICD-10-CM

## 2021-10-12 DIAGNOSIS — E11.9 TYPE 2 DIABETES MELLITUS WITHOUT COMPLICATION, WITHOUT LONG-TERM CURRENT USE OF INSULIN (HCC): ICD-10-CM

## 2021-10-12 DIAGNOSIS — I48.20 CHRONIC ATRIAL FIBRILLATION (HCC): Primary | ICD-10-CM

## 2021-10-12 DIAGNOSIS — R06.02 SHORTNESS OF BREATH: ICD-10-CM

## 2021-10-12 DIAGNOSIS — I10 BENIGN ESSENTIAL HYPERTENSION: ICD-10-CM

## 2021-10-12 DIAGNOSIS — R07.89 CHEST DISCOMFORT: ICD-10-CM

## 2021-10-12 PROCEDURE — 93000 ELECTROCARDIOGRAM COMPLETE: CPT | Performed by: INTERNAL MEDICINE

## 2021-10-12 PROCEDURE — 99214 OFFICE O/P EST MOD 30 MIN: CPT | Performed by: INTERNAL MEDICINE

## 2021-10-22 ENCOUNTER — TELEPHONE (OUTPATIENT)
Dept: CARDIOLOGY CLINIC | Facility: CLINIC | Age: 76
End: 2021-10-22

## 2021-10-22 ENCOUNTER — HOSPITAL ENCOUNTER (OUTPATIENT)
Dept: NON INVASIVE DIAGNOSTICS | Facility: CLINIC | Age: 76
Discharge: HOME | End: 2021-10-22
Payer: MEDICARE

## 2021-10-22 DIAGNOSIS — R06.02 SHORTNESS OF BREATH: ICD-10-CM

## 2021-10-22 DIAGNOSIS — R07.89 CHEST DISCOMFORT: ICD-10-CM

## 2021-10-22 LAB
NUC REST DIASTOLIC VOLUME INDEX: 100 ML/M2
NUC REST SYSTOLIC VOLUME INDEX: 61 ML/M2
NUC STRESS EJECTION FRACTION: 39 %
SL CV REST NUCLEAR ISOTOPE DOSE: 15.87 MCI
SL CV STRESS NUCLEAR ISOTOPE DOSE: 48.3 MCI
SL CV STRESS RECOVERY BP: NORMAL MMHG
SL CV STRESS RECOVERY HR: 75 BPM
STRESS ANGINA INDEX: 0
STRESS BASELINE BP: NORMAL MMHG
STRESS BASELINE HR: 72 BPM
STRESS O2 SAT REST: 96 %
STRESS PEAK HR: 90 BPM
STRESS POST O2 SAT PEAK: 98 %
STRESS POST PEAK BP: NORMAL MMHG
STRESS/REST PERFUSION RATIO: 1.16

## 2021-10-22 PROCEDURE — A9502 TC99M TETROFOSMIN: HCPCS

## 2021-10-22 PROCEDURE — 93018 CV STRESS TEST I&R ONLY: CPT | Performed by: INTERNAL MEDICINE

## 2021-10-22 PROCEDURE — G1004 CDSM NDSC: HCPCS

## 2021-10-22 PROCEDURE — 78452 HT MUSCLE IMAGE SPECT MULT: CPT

## 2021-10-22 PROCEDURE — 93017 CV STRESS TEST TRACING ONLY: CPT

## 2021-10-22 PROCEDURE — 78452 HT MUSCLE IMAGE SPECT MULT: CPT | Performed by: INTERNAL MEDICINE

## 2021-10-22 PROCEDURE — 93016 CV STRESS TEST SUPVJ ONLY: CPT | Performed by: INTERNAL MEDICINE

## 2021-10-22 RX ADMIN — REGADENOSON 0.4 MG: 0.08 INJECTION, SOLUTION INTRAVENOUS at 13:44

## 2021-10-22 NOTE — TELEPHONE ENCOUNTER
----- Message from Mague Elam MD sent at 10/22/2021  3:40 PM EDT -----  Regarding: Appointment next week  Please get this patient scheduled next week to go over the results of the stress test which are abnormal.  Thank you.

## 2021-10-25 DIAGNOSIS — I10 ESSENTIAL HYPERTENSION: ICD-10-CM

## 2021-10-25 LAB
ARRHY DURING EX: NORMAL
CHEST PAIN STATEMENT: NORMAL
MAX DIASTOLIC BP: 60 MMHG
MAX HEART RATE: 101 BPM
MAX PREDICTED HEART RATE: 146 BPM
MAX. SYSTOLIC BP: 112 MMHG
PROTOCOL NAME: NORMAL
REASON FOR TERMINATION: NORMAL
TARGET HR FORMULA: NORMAL
TEST INDICATION: NORMAL
TIME IN EXERCISE PHASE: NORMAL

## 2021-10-25 RX ORDER — IRBESARTAN 150 MG/1
150 TABLET ORAL DAILY
Qty: 90 TABLET | Refills: 3 | Status: SHIPPED | OUTPATIENT
Start: 2021-10-25

## 2021-10-28 ENCOUNTER — PREP FOR PROCEDURE (OUTPATIENT)
Dept: CARDIOLOGY CLINIC | Facility: CLINIC | Age: 76
End: 2021-10-28

## 2021-10-28 ENCOUNTER — APPOINTMENT (OUTPATIENT)
Dept: LAB | Facility: CLINIC | Age: 76
End: 2021-10-28
Payer: MEDICARE

## 2021-10-28 ENCOUNTER — OFFICE VISIT (OUTPATIENT)
Dept: CARDIOLOGY CLINIC | Facility: CLINIC | Age: 76
End: 2021-10-28
Payer: MEDICARE

## 2021-10-28 VITALS
OXYGEN SATURATION: 96 % | BODY MASS INDEX: 38.49 KG/M2 | SYSTOLIC BLOOD PRESSURE: 120 MMHG | HEART RATE: 93 BPM | HEIGHT: 68 IN | DIASTOLIC BLOOD PRESSURE: 76 MMHG | RESPIRATION RATE: 16 BRPM | WEIGHT: 254 LBS

## 2021-10-28 DIAGNOSIS — Z79.01 CHRONIC ANTICOAGULATION: ICD-10-CM

## 2021-10-28 DIAGNOSIS — E11.8 TYPE 2 DIABETES MELLITUS WITH COMPLICATION, WITHOUT LONG-TERM CURRENT USE OF INSULIN (HCC): ICD-10-CM

## 2021-10-28 DIAGNOSIS — R94.39 ABNORMAL CARDIOVASCULAR STRESS TEST: Primary | ICD-10-CM

## 2021-10-28 DIAGNOSIS — I48.20 CHRONIC ATRIAL FIBRILLATION (HCC): Primary | ICD-10-CM

## 2021-10-28 DIAGNOSIS — R07.9 CHEST PAIN, UNSPECIFIED TYPE: ICD-10-CM

## 2021-10-28 DIAGNOSIS — R06.02 SHORTNESS OF BREATH: ICD-10-CM

## 2021-10-28 DIAGNOSIS — R94.39 ABNORMAL CARDIOVASCULAR STRESS TEST: ICD-10-CM

## 2021-10-28 DIAGNOSIS — R07.89 CHEST DISCOMFORT: ICD-10-CM

## 2021-10-28 LAB
ANION GAP SERPL CALCULATED.3IONS-SCNC: 7 MMOL/L (ref 4–13)
BASOPHILS # BLD AUTO: 0.09 THOUSANDS/ΜL (ref 0–0.1)
BASOPHILS NFR BLD AUTO: 1 % (ref 0–1)
BUN SERPL-MCNC: 17 MG/DL (ref 5–25)
CALCIUM SERPL-MCNC: 10.2 MG/DL (ref 8.3–10.1)
CHLORIDE SERPL-SCNC: 106 MMOL/L (ref 100–108)
CO2 SERPL-SCNC: 24 MMOL/L (ref 21–32)
CREAT SERPL-MCNC: 1.09 MG/DL (ref 0.6–1.3)
EOSINOPHIL # BLD AUTO: 0.31 THOUSAND/ΜL (ref 0–0.61)
EOSINOPHIL NFR BLD AUTO: 3 % (ref 0–6)
ERYTHROCYTE [DISTWIDTH] IN BLOOD BY AUTOMATED COUNT: 13.1 % (ref 11.6–15.1)
GFR SERPL CREATININE-BSD FRML MDRD: 50 ML/MIN/1.73SQ M
GLUCOSE P FAST SERPL-MCNC: 138 MG/DL (ref 65–99)
HCT VFR BLD AUTO: 44 % (ref 34.8–46.1)
HGB BLD-MCNC: 14.5 G/DL (ref 11.5–15.4)
IMM GRANULOCYTES # BLD AUTO: 0.04 THOUSAND/UL (ref 0–0.2)
IMM GRANULOCYTES NFR BLD AUTO: 0 % (ref 0–2)
LYMPHOCYTES # BLD AUTO: 1.56 THOUSANDS/ΜL (ref 0.6–4.47)
LYMPHOCYTES NFR BLD AUTO: 16 % (ref 14–44)
MCH RBC QN AUTO: 31.5 PG (ref 26.8–34.3)
MCHC RBC AUTO-ENTMCNC: 33 G/DL (ref 31.4–37.4)
MCV RBC AUTO: 95 FL (ref 82–98)
MONOCYTES # BLD AUTO: 0.85 THOUSAND/ΜL (ref 0.17–1.22)
MONOCYTES NFR BLD AUTO: 9 % (ref 4–12)
NEUTROPHILS # BLD AUTO: 6.71 THOUSANDS/ΜL (ref 1.85–7.62)
NEUTS SEG NFR BLD AUTO: 71 % (ref 43–75)
NRBC BLD AUTO-RTO: 0 /100 WBCS
PLATELET # BLD AUTO: 296 THOUSANDS/UL (ref 149–390)
PMV BLD AUTO: 11.8 FL (ref 8.9–12.7)
POTASSIUM SERPL-SCNC: 4.5 MMOL/L (ref 3.5–5.3)
RBC # BLD AUTO: 4.61 MILLION/UL (ref 3.81–5.12)
SODIUM SERPL-SCNC: 137 MMOL/L (ref 136–145)
WBC # BLD AUTO: 9.56 THOUSAND/UL (ref 4.31–10.16)

## 2021-10-28 PROCEDURE — 80048 BASIC METABOLIC PNL TOTAL CA: CPT | Performed by: INTERNAL MEDICINE

## 2021-10-28 PROCEDURE — 99214 OFFICE O/P EST MOD 30 MIN: CPT | Performed by: INTERNAL MEDICINE

## 2021-10-28 PROCEDURE — 85025 COMPLETE CBC W/AUTO DIFF WBC: CPT | Performed by: INTERNAL MEDICINE

## 2021-10-28 PROCEDURE — 36415 COLL VENOUS BLD VENIPUNCTURE: CPT | Performed by: INTERNAL MEDICINE

## 2021-10-28 RX ORDER — NITROGLYCERIN 0.4 MG/1
0.4 TABLET SUBLINGUAL
Qty: 25 TABLET | Refills: 3 | Status: SHIPPED | OUTPATIENT
Start: 2021-10-28

## 2021-11-01 ENCOUNTER — HOSPITAL ENCOUNTER (OUTPATIENT)
Facility: HOSPITAL | Age: 76
Setting detail: OUTPATIENT SURGERY
Discharge: HOME | End: 2021-11-01
Attending: INTERNAL MEDICINE | Admitting: INTERNAL MEDICINE
Payer: MEDICARE

## 2021-11-01 VITALS
RESPIRATION RATE: 18 BRPM | SYSTOLIC BLOOD PRESSURE: 132 MMHG | DIASTOLIC BLOOD PRESSURE: 83 MMHG | WEIGHT: 251.1 LBS | OXYGEN SATURATION: 96 % | BODY MASS INDEX: 40.36 KG/M2 | HEART RATE: 87 BPM | HEIGHT: 66 IN | TEMPERATURE: 98.4 F

## 2021-11-01 DIAGNOSIS — R94.39 ABNORMAL CARDIOVASCULAR STRESS TEST: ICD-10-CM

## 2021-11-01 DIAGNOSIS — R07.9 CHEST PAIN, UNSPECIFIED TYPE: ICD-10-CM

## 2021-11-01 LAB — GLUCOSE SERPL-MCNC: 128 MG/DL (ref 65–140)

## 2021-11-01 PROCEDURE — 99153 MOD SED SAME PHYS/QHP EA: CPT | Performed by: INTERNAL MEDICINE

## 2021-11-01 PROCEDURE — 93454 CORONARY ARTERY ANGIO S&I: CPT | Performed by: INTERNAL MEDICINE

## 2021-11-01 PROCEDURE — 93005 ELECTROCARDIOGRAM TRACING: CPT

## 2021-11-01 PROCEDURE — 99152 MOD SED SAME PHYS/QHP 5/>YRS: CPT | Performed by: INTERNAL MEDICINE

## 2021-11-01 PROCEDURE — C1769 GUIDE WIRE: HCPCS | Performed by: INTERNAL MEDICINE

## 2021-11-01 PROCEDURE — 82948 REAGENT STRIP/BLOOD GLUCOSE: CPT

## 2021-11-01 PROCEDURE — C1894 INTRO/SHEATH, NON-LASER: HCPCS | Performed by: INTERNAL MEDICINE

## 2021-11-01 RX ORDER — SODIUM CHLORIDE 9 MG/ML
75 INJECTION, SOLUTION INTRAVENOUS CONTINUOUS
Status: DISPENSED | OUTPATIENT
Start: 2021-11-01 | End: 2021-11-01

## 2021-11-01 RX ORDER — NITROGLYCERIN 20 MG/100ML
INJECTION INTRAVENOUS AS NEEDED
Status: DISCONTINUED | OUTPATIENT
Start: 2021-11-01 | End: 2021-11-01 | Stop reason: HOSPADM

## 2021-11-01 RX ORDER — LIDOCAINE WITH 8.4% SOD BICARB 0.9%(10ML)
SYRINGE (ML) INJECTION AS NEEDED
Status: DISCONTINUED | OUTPATIENT
Start: 2021-11-01 | End: 2021-11-01 | Stop reason: HOSPADM

## 2021-11-01 RX ORDER — ISOSORBIDE MONONITRATE 30 MG/1
30 TABLET, EXTENDED RELEASE ORAL DAILY
Qty: 30 TABLET | Refills: 3 | Status: SHIPPED | OUTPATIENT
Start: 2021-11-01 | End: 2022-01-27 | Stop reason: SDUPTHER

## 2021-11-01 RX ORDER — FENTANYL CITRATE 50 UG/ML
INJECTION, SOLUTION INTRAMUSCULAR; INTRAVENOUS AS NEEDED
Status: DISCONTINUED | OUTPATIENT
Start: 2021-11-01 | End: 2021-11-01 | Stop reason: HOSPADM

## 2021-11-01 RX ORDER — VERAPAMIL HYDROCHLORIDE 2.5 MG/ML
INJECTION, SOLUTION INTRAVENOUS AS NEEDED
Status: DISCONTINUED | OUTPATIENT
Start: 2021-11-01 | End: 2021-11-01 | Stop reason: HOSPADM

## 2021-11-01 RX ORDER — HEPARIN SODIUM 1000 [USP'U]/ML
INJECTION, SOLUTION INTRAVENOUS; SUBCUTANEOUS AS NEEDED
Status: DISCONTINUED | OUTPATIENT
Start: 2021-11-01 | End: 2021-11-01 | Stop reason: HOSPADM

## 2021-11-01 RX ORDER — MIDAZOLAM HYDROCHLORIDE 2 MG/2ML
INJECTION, SOLUTION INTRAMUSCULAR; INTRAVENOUS AS NEEDED
Status: DISCONTINUED | OUTPATIENT
Start: 2021-11-01 | End: 2021-11-01 | Stop reason: HOSPADM

## 2021-11-01 RX ADMIN — SODIUM CHLORIDE 172.5 ML: 0.9 INJECTION, SOLUTION INTRAVENOUS at 07:32

## 2021-11-01 RX ADMIN — SODIUM CHLORIDE 75 ML/HR: 0.9 INJECTION, SOLUTION INTRAVENOUS at 09:15

## 2021-11-02 LAB
ATRIAL RATE: 258 BPM
QRS AXIS: 81 DEGREES
QRSD INTERVAL: 78 MS
QT INTERVAL: 378 MS
QTC INTERVAL: 425 MS
T WAVE AXIS: 68 DEGREES
VENTRICULAR RATE: 76 BPM

## 2022-01-27 DIAGNOSIS — R07.9 CHEST PAIN, UNSPECIFIED TYPE: ICD-10-CM

## 2022-01-27 RX ORDER — ISOSORBIDE MONONITRATE 30 MG/1
30 TABLET, EXTENDED RELEASE ORAL DAILY
Qty: 90 TABLET | Refills: 3 | Status: SHIPPED | OUTPATIENT
Start: 2022-01-27

## 2022-03-19 DIAGNOSIS — I10 HYPERTENSION, ESSENTIAL: ICD-10-CM

## 2022-03-20 NOTE — TELEPHONE ENCOUNTER
Called patient to schedule and appointment for refill and she stated that she is no longer being seen by Dr Mendoza [place]   Refill cancelled

## 2022-06-01 DIAGNOSIS — I48.20 CHRONIC ATRIAL FIBRILLATION (HCC): ICD-10-CM

## 2022-06-02 RX ORDER — APIXABAN 5 MG/1
TABLET, FILM COATED ORAL
Qty: 180 TABLET | Refills: 3 | Status: SHIPPED | OUTPATIENT
Start: 2022-06-02

## 2023-07-19 DIAGNOSIS — I48.20 CHRONIC ATRIAL FIBRILLATION (HCC): ICD-10-CM

## 2023-07-19 RX ORDER — APIXABAN 5 MG/1
TABLET, FILM COATED ORAL
Qty: 180 TABLET | Refills: 0 | Status: SHIPPED | OUTPATIENT
Start: 2023-07-19

## 2023-09-22 DIAGNOSIS — R07.9 CHEST PAIN, UNSPECIFIED TYPE: ICD-10-CM

## 2023-09-25 RX ORDER — ISOSORBIDE MONONITRATE 30 MG/1
30 TABLET, EXTENDED RELEASE ORAL DAILY
Qty: 90 TABLET | Refills: 3 | Status: SHIPPED | OUTPATIENT
Start: 2023-09-25

## 2025-03-24 NOTE — TELEPHONE ENCOUNTER
She is way overdue for an appointment  She needs an appointment  She had those to immunizations 3 years ago    She is due at 5 years This is a follow-up visit and patient is approximately the same weight as she was around 3 months ago.  Currently is in maintenance phase and taking Wegovy 1.7 mg and tolerating well.  We will space visits out to every 6 months if tolerated.  A1c and CMP ordered to be completed in a couple months prior to next office visit.

## (undated) DEVICE — TR BAND RADIAL ARTERY COMPRESSION DEVICE: Brand: TR BAND

## (undated) DEVICE — RADIFOCUS OPTITORQUE ANGIOGRAPHIC CATHETER: Brand: OPTITORQUE

## (undated) DEVICE — GLIDESHEATH SLENDER STAINLESS STEEL KIT: Brand: GLIDESHEATH SLENDER

## (undated) DEVICE — GUIDEWIRE WHOLEY HI TORQUE INTERM MOD J.035 260CM